# Patient Record
Sex: FEMALE | Race: WHITE | NOT HISPANIC OR LATINO | URBAN - METROPOLITAN AREA
[De-identification: names, ages, dates, MRNs, and addresses within clinical notes are randomized per-mention and may not be internally consistent; named-entity substitution may affect disease eponyms.]

---

## 2022-07-10 ENCOUNTER — MEDICAL CORRESPONDENCE (OUTPATIENT)
Dept: HEALTH INFORMATION MANAGEMENT | Facility: CLINIC | Age: 57
End: 2022-07-10

## 2022-07-29 ENCOUNTER — HOSPITAL ENCOUNTER (OUTPATIENT)
Dept: GENERAL RADIOLOGY | Facility: CLINIC | Age: 57
Discharge: HOME | End: 2022-07-29
Attending: ORTHOPAEDIC SURGERY | Admitting: ORTHOPAEDIC SURGERY
Payer: COMMERCIAL

## 2022-07-29 VITALS — DIASTOLIC BLOOD PRESSURE: 71 MMHG | HEART RATE: 57 BPM | OXYGEN SATURATION: 100 % | SYSTOLIC BLOOD PRESSURE: 134 MMHG

## 2022-07-29 DIAGNOSIS — M79.673 FOOT PAIN: ICD-10-CM

## 2022-07-29 PROCEDURE — 77002 NEEDLE LOCALIZATION BY XRAY: CPT

## 2022-07-29 PROCEDURE — 255N000002 HC RX 255 OP 636: Performed by: ORTHOPAEDIC SURGERY

## 2022-07-29 PROCEDURE — 250N000011 HC RX IP 250 OP 636: Performed by: ORTHOPAEDIC SURGERY

## 2022-07-29 PROCEDURE — 250N000009 HC RX 250: Performed by: ORTHOPAEDIC SURGERY

## 2022-07-29 RX ORDER — IOPAMIDOL 408 MG/ML
10 INJECTION, SOLUTION INTRATHECAL ONCE
Status: COMPLETED | OUTPATIENT
Start: 2022-07-29 | End: 2022-07-29

## 2022-07-29 RX ORDER — TRIAMCINOLONE ACETONIDE 40 MG/ML
40 INJECTION, SUSPENSION INTRA-ARTICULAR; INTRAMUSCULAR ONCE
Status: COMPLETED | OUTPATIENT
Start: 2022-07-29 | End: 2022-07-29

## 2022-07-29 RX ADMIN — LIDOCAINE HYDROCHLORIDE 2.5 ML: 10 INJECTION, SOLUTION EPIDURAL; INFILTRATION; INTRACAUDAL; PERINEURAL at 10:27

## 2022-07-29 RX ADMIN — TRIAMCINOLONE ACETONIDE 40 MG: 40 INJECTION, SUSPENSION INTRA-ARTICULAR; INTRAMUSCULAR at 10:43

## 2022-07-29 RX ADMIN — IOPAMIDOL 1 ML: 408 INJECTION, SOLUTION INTRATHECAL at 10:28

## 2022-07-29 NOTE — DISCHARGE INSTRUCTIONS
JOINT STEROID INJECTION DISCHARGE INSTRUCTIONS    What to expect  After the procedure, you may feel some temporary relief from the local anesthetic, but that will likely wear off within a few hours. Your symptoms may then return to pre-procedure level, or even be temporarily worse for a day or two.  For many people, the steroid begins to provide some relief within 2-3 days, but it can take up to 2 weeks. If you have no improvement in your symptoms after two weeks, please contact your referring provider to discuss next steps.  What to do  Minimize your activity today. You may resume your normal activity tomorrow as tolerated. Avoid vigorous or strenuous activity until your symptoms improve, or as directed by your referring provider.   You may shower tomorrow, but do not submerge in bathtub, hot tub or pool for 48 hours.    Use ice packs as needed for discomfort.  You may resume all medications, including blood thinners.  You may take over the counter acetaminophen (Tylenol) or ibuprofen (Motrin, Advil) for mild discomfort after the procedure.    What to watch for  Bruising or slight swelling at the puncture site can be normal. If you begin to develop redness or excessive swelling at the site, fever over 1010F, notable increase in pain, weakness, or numbness, please contact your primary care or referring provider.  Side effects from the steroid are often mild and go away in a few days. Common side effects may include facial flushing, restlessness, irritability, difficulty sleeping, elevated blood pressure, and increased blood sugar.   If you have diabetes, monitor your blood sugar closely. Contact the provider who manages your diabetes to help you control your blood sugar if needed.  If you have questions or concerns  You may contact the [facility]iology Department at [phone] between 8am-4:30pm Monday through Friday.  If you have urgent questions outside of these normal business hours, please contact  the [facility] on-call physician at [phone].    The provider who performed your procedure was Dr. Arriaga.

## 2022-08-17 ENCOUNTER — LAB (OUTPATIENT)
Dept: LAB | Facility: CLINIC | Age: 57
End: 2022-08-17
Payer: COMMERCIAL

## 2022-08-17 DIAGNOSIS — E03.9 HYPOTHYROIDISM, UNSPECIFIED: Primary | ICD-10-CM

## 2022-08-17 DIAGNOSIS — E03.9 HYPOTHYROIDISM, UNSPECIFIED: ICD-10-CM

## 2022-08-17 LAB
T3FREE SERPL-MCNC: 2.7 PG/ML (ref 2–4.4)
T4 FREE SERPL-MCNC: 0.98 NG/DL (ref 0.76–1.46)
TSH SERPL DL<=0.005 MIU/L-ACNC: 0.13 MU/L (ref 0.4–4)

## 2022-08-17 PROCEDURE — 84439 ASSAY OF FREE THYROXINE: CPT

## 2022-08-17 PROCEDURE — 36415 COLL VENOUS BLD VENIPUNCTURE: CPT

## 2022-08-17 PROCEDURE — 84481 FREE ASSAY (FT-3): CPT

## 2022-08-17 PROCEDURE — 84443 ASSAY THYROID STIM HORMONE: CPT

## 2022-10-28 ENCOUNTER — OFFICE VISIT (OUTPATIENT)
Dept: FAMILY MEDICINE | Facility: CLINIC | Age: 57
End: 2022-10-28
Payer: COMMERCIAL

## 2022-10-28 VITALS
HEIGHT: 64 IN | SYSTOLIC BLOOD PRESSURE: 126 MMHG | DIASTOLIC BLOOD PRESSURE: 84 MMHG | OXYGEN SATURATION: 100 % | TEMPERATURE: 99.1 F | BODY MASS INDEX: 22.53 KG/M2 | HEART RATE: 60 BPM | WEIGHT: 132 LBS

## 2022-10-28 DIAGNOSIS — Z01.818 PREOP GENERAL PHYSICAL EXAM: Primary | ICD-10-CM

## 2022-10-28 DIAGNOSIS — Z12.31 ENCOUNTER FOR SCREENING MAMMOGRAM FOR BREAST CANCER: ICD-10-CM

## 2022-10-28 DIAGNOSIS — F41.9 ANXIETY: ICD-10-CM

## 2022-10-28 DIAGNOSIS — M79.671 RIGHT FOOT PAIN: ICD-10-CM

## 2022-10-28 PROBLEM — M79.7 FIBROMYALGIA: Status: ACTIVE | Noted: 2021-01-07

## 2022-10-28 PROBLEM — F10.20 ALCOHOL DEPENDENCE (H): Status: ACTIVE | Noted: 2017-01-22

## 2022-10-28 PROBLEM — M25.569 KNEE PAIN: Status: ACTIVE | Noted: 2022-04-12

## 2022-10-28 PROBLEM — E78.5 HYPERLIPIDEMIA: Status: ACTIVE | Noted: 2022-10-28

## 2022-10-28 PROBLEM — M25.559 HIP PAIN: Status: ACTIVE | Noted: 2022-04-12

## 2022-10-28 LAB
BASOPHILS # BLD AUTO: 0 10E3/UL (ref 0–0.2)
BASOPHILS NFR BLD AUTO: 1 %
EOSINOPHIL # BLD AUTO: 0.2 10E3/UL (ref 0–0.7)
EOSINOPHIL NFR BLD AUTO: 3 %
ERYTHROCYTE [DISTWIDTH] IN BLOOD BY AUTOMATED COUNT: 12.4 % (ref 10–15)
HCT VFR BLD AUTO: 40.4 % (ref 35–47)
HGB BLD-MCNC: 13.4 G/DL (ref 11.7–15.7)
LYMPHOCYTES # BLD AUTO: 2.1 10E3/UL (ref 0.8–5.3)
LYMPHOCYTES NFR BLD AUTO: 38 %
MCH RBC QN AUTO: 33.1 PG (ref 26.5–33)
MCHC RBC AUTO-ENTMCNC: 33.2 G/DL (ref 31.5–36.5)
MCV RBC AUTO: 100 FL (ref 78–100)
MONOCYTES # BLD AUTO: 0.5 10E3/UL (ref 0–1.3)
MONOCYTES NFR BLD AUTO: 9 %
NEUTROPHILS # BLD AUTO: 2.8 10E3/UL (ref 1.6–8.3)
NEUTROPHILS NFR BLD AUTO: 50 %
PLATELET # BLD AUTO: 318 10E3/UL (ref 150–450)
RBC # BLD AUTO: 4.05 10E6/UL (ref 3.8–5.2)
WBC # BLD AUTO: 5.7 10E3/UL (ref 4–11)

## 2022-10-28 PROCEDURE — 80053 COMPREHEN METABOLIC PANEL: CPT | Performed by: PHYSICIAN ASSISTANT

## 2022-10-28 PROCEDURE — 99204 OFFICE O/P NEW MOD 45 MIN: CPT | Performed by: PHYSICIAN ASSISTANT

## 2022-10-28 PROCEDURE — 36415 COLL VENOUS BLD VENIPUNCTURE: CPT | Performed by: PHYSICIAN ASSISTANT

## 2022-10-28 PROCEDURE — 85025 COMPLETE CBC W/AUTO DIFF WBC: CPT | Performed by: PHYSICIAN ASSISTANT

## 2022-10-28 RX ORDER — ALPRAZOLAM 0.25 MG
TABLET ORAL
COMMUNITY
Start: 2022-09-16 | End: 2022-10-28

## 2022-10-28 RX ORDER — ZALEPLON 5 MG/1
CAPSULE ORAL
COMMUNITY
Start: 2022-09-22 | End: 2022-10-28

## 2022-10-28 RX ORDER — DULOXETIN HYDROCHLORIDE 60 MG/1
60 CAPSULE, DELAYED RELEASE ORAL DAILY
Qty: 30 CAPSULE | Refills: 1 | Status: SHIPPED | OUTPATIENT
Start: 2022-10-28 | End: 2022-11-28

## 2022-10-28 RX ORDER — LIOTHYRONINE SODIUM 5 UG/1
TABLET ORAL
COMMUNITY
Start: 2022-07-30 | End: 2022-10-28

## 2022-10-28 RX ORDER — DULOXETIN HYDROCHLORIDE 60 MG/1
CAPSULE, DELAYED RELEASE ORAL
COMMUNITY
Start: 2022-07-13 | End: 2022-10-28

## 2022-10-28 RX ORDER — DULOXETIN HYDROCHLORIDE 30 MG/1
CAPSULE, DELAYED RELEASE ORAL
COMMUNITY
Start: 2022-07-21 | End: 2022-10-28 | Stop reason: DRUGHIGH

## 2022-10-28 RX ORDER — ALPRAZOLAM 0.25 MG
0.25 TABLET ORAL
Qty: 30 TABLET | Refills: 1 | Status: SHIPPED | OUTPATIENT
Start: 2022-10-28

## 2022-10-28 RX ORDER — LEVOTHYROXINE SODIUM 125 UG/1
TABLET ORAL
COMMUNITY
Start: 2022-10-12

## 2022-10-28 RX ORDER — PROPRANOLOL HYDROCHLORIDE 10 MG/1
TABLET ORAL
COMMUNITY
Start: 2022-07-21 | End: 2022-10-28

## 2022-10-28 RX ORDER — NALTREXONE HYDROCHLORIDE 50 MG/1
TABLET, FILM COATED ORAL
COMMUNITY
Start: 2022-10-12

## 2022-10-28 RX ORDER — ZALEPLON 5 MG/1
10 CAPSULE ORAL AT BEDTIME
Qty: 60 CAPSULE | Refills: 1 | Status: SHIPPED | OUTPATIENT
Start: 2022-10-28 | End: 2022-11-28

## 2022-10-28 ASSESSMENT — ENCOUNTER SYMPTOMS
SHORTNESS OF BREATH: 0
MYALGIAS: 0
JOINT SWELLING: 1
DIZZINESS: 0
HEMATURIA: 0
DYSURIA: 0
FEVER: 0
BREAST MASS: 0
PALPITATIONS: 0
ABDOMINAL PAIN: 0
ARTHRALGIAS: 1
COUGH: 0
HEADACHES: 0
NERVOUS/ANXIOUS: 0
DIARRHEA: 0
WEAKNESS: 0
EYE PAIN: 0
FREQUENCY: 0
SORE THROAT: 0
HEARTBURN: 0
PARESTHESIAS: 0
CHILLS: 0
HEMATOCHEZIA: 0
NAUSEA: 0
CONSTIPATION: 0

## 2022-10-28 ASSESSMENT — PATIENT HEALTH QUESTIONNAIRE - PHQ9
SUM OF ALL RESPONSES TO PHQ QUESTIONS 1-9: 7
SUM OF ALL RESPONSES TO PHQ QUESTIONS 1-9: 7
10. IF YOU CHECKED OFF ANY PROBLEMS, HOW DIFFICULT HAVE THESE PROBLEMS MADE IT FOR YOU TO DO YOUR WORK, TAKE CARE OF THINGS AT HOME, OR GET ALONG WITH OTHER PEOPLE: NOT DIFFICULT AT ALL

## 2022-10-28 NOTE — PATIENT INSTRUCTIONS
PLEASE CALL TO SCHEDULE YOUR MAMMOGRAM  [facility] [phone]  [facility] [phone]  [facility]   [phone]  Central Scheduling (all locations, diagnostic also) [phone]    If you are under/uninsured, we recommend you contact the [facility]. They offer mammograms on a sliding fee charge. You can schedule with them at [phone].   Preparing for Your Surgery  Getting started  A nurse will call you to review your health history and instructions. They will give you an arrival time based on your scheduled surgery time. Please be ready to share:  Your doctor's clinic name and phone number  Your medical, surgical and anesthesia history  A list of allergies and sensitivities  A list of medicines, including herbal treatments and over-the-counter drugs  Whether the patient has a legal guardian (ask how to send us the papers in advance)  Please tell us if you're pregnant--or if there's any chance you might be pregnant. Some surgeries may injure a fetus (unborn baby), so they require a pregnancy test. Surgeries that are safe for a fetus don't always need a test, and you can choose whether to have one.   If you have a child who's having surgery, please ask for a copy of Preparing for Your Child's Surgery.    Preparing for surgery  Within 10 to 30 days of surgery: Have a pre-op exam (sometimes called an H&P, or History and Physical). This can be done at a clinic or pre-operative center.  If you're having a , you may not need this exam. Talk to your care team.  At your pre-op exam, talk to your care team about all medicines you take. If you need to stop any medicines before surgery, ask when to start taking them again.  We do this for your safety. Many medicines can make you bleed too much during surgery. Some change how well surgery (anesthesia) drugs work.  Call your insurance company to let them know you're having surgery. (If you don't have insurance,  call [phone].)  Call your clinic if there's any change in your health. This includes signs of a cold or flu (sore throat, runny nose, cough, rash, fever). It also includes a scrape or scratch near the surgery site.  If you have questions on the day of surgery, call your hospital or surgery center.  COVID testing  You may need to be tested for COVID-19 before having surgery. If so, we will give you instructions (or click here).  Eating and drinking guidelines  For your safety: Unless your surgeon tells you otherwise, follow the guidelines below.  Eat and drink as usual until 8 hours before surgery. After that, no food or milk.  Drink clear liquids until 2 hours before surgery. These are liquids you can see through, like water, Gatorade and Propel Water. You may also have black coffee and tea (no cream or milk).  Nothing by mouth within 2 hours of surgery. This includes gum, candy and breath mints.  If you drink alcohol: Stop drinking it the night before surgery.  If your care team tells you to take medicine on the morning of surgery, it's okay to take it with a sip of water.  Preventing infection  Shower or bathe the night before and morning of your surgery. Follow the instructions your clinic gave you. (If no instructions, use regular soap.)  Don't shave or clip hair near your surgery site. We'll remove the hair if needed.  Don't smoke or vape the morning of surgery. You may chew nicotine gum up to 2 hours before surgery. A nicotine patch is okay.  Note: Some surgeries require you to completely quit smoking and nicotine. Check with your surgeon.  Your care team will make every effort to keep you safe from infection. We will:  Clean our hands often with soap and water (or an alcohol-based hand rub).  Clean the skin at your surgery site with a special soap that kills germs.  Give you a special gown to keep you warm. (Cold raises the risk of infection.)  Wear special hair covers, masks, gowns and gloves during  surgery.  Give antibiotic medicine, if prescribed. Not all surgeries need antibiotics.  What to bring on the day of surgery  Photo ID and insurance card  Copy of your health care directive, if you have one  Glasses and hearing aides (bring cases)  You can't wear contacts during surgery  Inhaler and eye drops, if you use them (tell us about these when you arrive)  CPAP machine or breathing device, if you use them  A few personal items, if spending the night  If you have . . .  A pacemaker, ICD (cardiac defibrillator) or other implant: Bring the ID card.  An implanted stimulator: Bring the remote control.  A legal guardian: Bring a copy of the certified (court-stamped) guardianship papers.  Please remove any jewelry, including body piercings. Leave jewelry and other valuables at home.  If you're going home the day of surgery  You must have a responsible adult drive you home. They should stay with you overnight as well.  If you don't have someone to stay with you, and you aren't safe to go home alone, we may keep you overnight. Insurance often won't pay for this.  After surgery  If it's hard to control your pain or you need more pain medicine, please call your surgeon's office.  Questions?   If you have any questions for your care team, list them here: _________________________________________________________________________________________________________________________________________________________________________ ____________________________________ ____________________________________ ____________________________________  For informational purposes only. Not to replace the advice of your health care provider. Copyright   2003, 2019 [facility]. All rights reserved. Clinically reviewed by Sabi Childs MD. [employer] [id number] - REV 07/22.

## 2022-10-28 NOTE — PROGRESS NOTES
[facility]  [address], [address]  [facility] [address]  Phone: [phone]  Primary Provider: No Ref-Primary, Physician  Pre-op Performing Provider: JACKSON SAGASTUME      PREOPERATIVE EVALUATION:  Today's date: 10/28/2022    Inna Phillip is a 56 year old female who presents for a preoperative evaluation.    Surgical Information:  Surgery/Procedure: Fusion Metatarsol and Shave Biospy in right foot   Surgery Location: [facility]   Surgeon: tbd  Surgery Date: 11/21/22  Time of Surgery: TBD  Where patient plans to recover: At home with family  Fax number for surgical facility: tbd    Type of Anesthesia Anticipated: to be determined    Assessment & Plan     The proposed surgical procedure is considered INTERMEDIATE risk.    Preop general physical exam  Right foot pain  - Comprehensive metabolic panel; Future  - CBC with Platelets & Differential; Future    Anxiety  Long standing, chronic, controlled- patient establishing care with new PCP next month but needs refills on regular/daily medicines until then; previous psychiatrist has retired and she would prefer not to have to go back to psychiatry due to expense. Refills sent. Proper use and risk for abuse discussed with patient at length. Patient to return to clinic when refills needed.   - DULoxetine (CYMBALTA) 60 MG capsule; Take 1 capsule (60 mg) by mouth daily  - zaleplon (SONATA) 5 MG capsule; Take 2 capsules (10 mg) by mouth At Bedtime  - ALPRAZolam (XANAX) 0.25 MG tablet; Take 1 tablet (0.25 mg) by mouth nightly as needed for anxiety    Encounter for screening mammogram for breast cancer  - *MA Screening Digital Bilateral; Future    Risks and Recommendations:  The patient has the following additional risks and recommendations for perioperative complications:   - No identified additional risk factors other than previously addressed    Medication Instructions:   - naltrexone PO: HOLD for 3  days (72 hours) prior to surgery.   - Benzodiazepines: Continue without modification.   - SSRIs, SNRIs, TCAs, Antipsychotics: Continue without modification.     RECOMMENDATION:  APPROVAL GIVEN to proceed with proposed procedure, without further diagnostic evaluation.    Review of prior external note(s) from - previous routine PCP and speciality notes      40 minutes spent on the date of the encounter doing chart review, history and exam, documentation and further activities per the note        Subjective     HPI related to upcoming procedure: history of right foot pain/issue with plans for surgical repair      Preop Questions 10/28/2022   1. Have you ever had a heart attack or stroke? No   2. Have you ever had surgery on your heart or blood vessels, such as a stent placement, a coronary artery bypass, or surgery on an artery in your head, neck, heart, or legs? No   3. Do you have chest pain with activity? No   4. Do you have a history of  heart failure? No   5. Do you currently have a cold, bronchitis or symptoms of other infection? No   6. Do you have a cough, shortness of breath, or wheezing? No   7. Do you or anyone in your family have previous history of blood clots? No   8. Do you or does anyone in your family have a serious bleeding problem such as prolonged bleeding following surgeries or cuts? No   9. Have you ever had problems with anemia or been told to take iron pills? No   10. Have you had any abnormal blood loss such as black, tarry or bloody stools, or abnormal vaginal bleeding? No   11. Have you ever had a blood transfusion? No   12. Are you willing to have a blood transfusion if it is medically needed before, during, or after your surgery? Yes   13. Have you or any of your relatives ever had problems with anesthesia? YES   14. Do you have sleep apnea, excessive snoring or daytime drowsiness? No   15. Do you have any artifical heart valves or other implanted medical devices like a pacemaker,  defibrillator, or continuous glucose monitor? No   16. Do you have artificial joints? No   17. Are you allergic to latex? No   18. Is there any chance that you may be pregnant? No     Health Care Directive:  Patient does not have a Health Care Directive or Living Will: Discussed advance care planning with patient; however, patient declined at this time.    Preoperative Review of :   reviewed - controlled substances reflected in medication list.      Status of Chronic Conditions:  See problem list for active medical problems.  Problems all longstanding and stable, except as noted/documented.  See ROS for pertinent symptoms related to these conditions.    HYPOTHYROIDISM - Patient has a longstanding history of chronic Hypothyroidism. Patient has been doing well, noting no tremor, insomnia, hair loss or changes in skin texture. Continues to take medications as directed, without adverse reactions or side effects. Last TSH   Lab Results   Component Value Date    TSH 0.13 (L) 08/17/2022   .        Patient seeing endocrinology prior to surgery as well - will update TSH levels at that time.    Review of Systems   Constitutional: Negative for chills and fever.   HENT: Positive for hearing loss. Negative for congestion, ear pain and sore throat.    Eyes: Negative for pain and visual disturbance.   Respiratory: Negative for cough and shortness of breath.    Cardiovascular: Negative for chest pain and palpitations.   Gastrointestinal: Negative for abdominal pain, constipation, diarrhea and nausea.   Genitourinary: Negative for dysuria, frequency, genital sores, hematuria, pelvic pain, urgency, vaginal bleeding and vaginal discharge.   Musculoskeletal: Positive for arthralgias and joint swelling. Negative for myalgias.   Skin: Negative for rash.   Neurological: Negative for dizziness, weakness and headaches.   Psychiatric/Behavioral: The patient is not nervous/anxious.      Constitutional, neuro, ENT, endocrine, pulmonary,  cardiac, gastrointestinal, genitourinary, musculoskeletal, integument and psychiatric systems are negative, except as otherwise noted.    Patient Active Problem List    Diagnosis Date Noted     Hyperlipidemia 10/28/2022     Priority: Medium     Hip pain 04/12/2022     Priority: Medium     Knee pain 04/12/2022     Priority: Medium     Fibromyalgia 01/07/2021     Priority: Medium     Cervical high risk HPV (human papillomavirus) test positive 10/28/2019     Priority: Medium     Formatting of this note might be different from the original.  [facility] Review:    History:    10/2019: [facility]-H, HPV+ (non 16/18)  10/2019: [place] neg  06/2021: NILM HPV+ (non 16/18)    Plan, per ASCCP guidelines: Colposcopy       Alcohol dependence (H) 01/22/2017     Priority: Medium     Chronic insomnia 07/30/2014     Priority: Medium     Allergic rhinitis 01/22/2014     Priority: Medium     Anxiety 04/28/2012     Priority: Medium     Bipolar 2 disorder (H) 01/02/2008     Priority: Medium     Formatting of this note might be different from the original.  She has tried several med, cymbalta seems to work the best for her. She asked for a re-start of this med 12/3/16, risks/benefits and dosing reviewed. She has a psychiatrist follow up appt in approx a month.       Hypothyroidism 01/02/2008     Priority: Medium      History reviewed. No pertinent past medical history.  History reviewed. No pertinent surgical history.  Current Outpatient Medications   Medication Sig Dispense Refill     ALPRAZolam (XANAX) 0.25 MG tablet Take 1 tablet (0.25 mg) by mouth nightly as needed for anxiety 30 tablet 1     DULoxetine (CYMBALTA) 60 MG capsule Take 1 capsule (60 mg) by mouth daily 30 capsule 1     levothyroxine (SYNTHROID/LEVOTHROID) 125 MCG tablet TAKE 1/2 TABLET BY MOUTH EVERY MORNING BEFORE A MEAL       naltrexone (DEPADE/REVIA) 50 MG tablet PATIENT TO MAKE UP A NALTREXONE SOLUTION PER PROVIDER DIRECTIONS. MAX DOSE OF 4.5 MG PER DAY       zaleplon (SONATA) 5 MG  "capsule Take 2 capsules (10 mg) by mouth At Bedtime 60 capsule 1       Allergies   Allergen Reactions     Dust Mites      Molds & Smuts      Pollen Extract         Social History     Tobacco Use     Smoking status: Not on file     Smokeless tobacco: Not on file   Substance Use Topics     Alcohol use: Not on file       History   Drug Use Not on file         Objective     /84   Pulse 60   Temp 99.1  F (37.3  C) (Temporal)   Ht 1.626 m (5' 4\")   Wt 59.9 kg (132 lb)   SpO2 100%   BMI 22.66 kg/m      Physical Exam    GENERAL APPEARANCE: healthy, alert and no distress     EYES: EOMI, PERRL     HENT: ear canals and TM's normal and nose and mouth without ulcers or lesions     NECK: no adenopathy, no asymmetry, masses, or scars and thyroid normal to palpation     RESP: lungs clear to auscultation - no rales, rhonchi or wheezes     CV: regular rates and rhythm, normal S1 S2, no S3 or S4 and no murmur, click or rub     ABDOMEN:  soft, nontender, no HSM or masses and bowel sounds normal     MS: extremities normal- no gross deformities noted, no evidence of inflammation in joints, FROM in all extremities.     SKIN: no suspicious lesions or rashes     NEURO: Normal strength and tone, sensory exam grossly normal, mentation intact and speech normal     PSYCH: mentation appears normal. and affect normal/bright     LYMPHATICS: No cervical adenopathy    No results for input(s): HGB, PLT, INR, NA, POTASSIUM, CR, A1C in the last [id number] hours.     Diagnostics:  Labs pending at this time.  Results will be reviewed when available.   No EKG required, no history of coronary heart disease, significant arrhythmia, peripheral arterial disease or other structural heart disease.    Revised Cardiac Risk Index (RCRI):  The patient has the following serious cardiovascular risks for perioperative complications:   - No serious cardiac risks = 0 points     RCRI Interpretation: 0 points: Class I (very low risk - 0.4% complication rate)       "     Signed Electronically by: Deepa Downs PA-C  Copy of this evaluation report is provided to requesting physician.

## 2022-10-29 LAB
ALBUMIN SERPL-MCNC: 4.2 G/DL (ref 3.4–5)
ALP SERPL-CCNC: 62 U/L (ref 40–150)
ALT SERPL W P-5'-P-CCNC: 19 U/L (ref 0–50)
ANION GAP SERPL CALCULATED.3IONS-SCNC: 6 MMOL/L (ref 3–14)
AST SERPL W P-5'-P-CCNC: 14 U/L (ref 0–45)
BILIRUB SERPL-MCNC: 0.6 MG/DL (ref 0.2–1.3)
BUN SERPL-MCNC: 16 MG/DL (ref 7–30)
CALCIUM SERPL-MCNC: 9.5 MG/DL (ref 8.5–10.1)
CHLORIDE BLD-SCNC: 106 MMOL/L (ref 94–109)
CO2 SERPL-SCNC: 27 MMOL/L (ref 20–32)
CREAT SERPL-MCNC: 0.57 MG/DL (ref 0.52–1.04)
GFR SERPL CREATININE-BSD FRML MDRD: >90 ML/MIN/1.73M2
GLUCOSE BLD-MCNC: 105 MG/DL (ref 70–99)
POTASSIUM BLD-SCNC: 4.2 MMOL/L (ref 3.4–5.3)
PROT SERPL-MCNC: 7.3 G/DL (ref 6.8–8.8)
SODIUM SERPL-SCNC: 139 MMOL/L (ref 133–144)

## 2022-10-31 NOTE — RESULT ENCOUNTER NOTE
"Car Keith  Your attached labs are within normal limits. Best of luck with your upcoming surgery.  Please contact the office with any questions or concerns.    Deepa Riley \"José Miguel\" AURORA Downs    "

## 2022-11-19 ENCOUNTER — HEALTH MAINTENANCE LETTER (OUTPATIENT)
Age: 57
End: 2022-11-19

## 2022-11-28 ENCOUNTER — OFFICE VISIT (OUTPATIENT)
Dept: FAMILY MEDICINE | Facility: CLINIC | Age: 57
End: 2022-11-28
Payer: COMMERCIAL

## 2022-11-28 VITALS
RESPIRATION RATE: 16 BRPM | DIASTOLIC BLOOD PRESSURE: 89 MMHG | BODY MASS INDEX: 22.2 KG/M2 | WEIGHT: 130 LBS | HEIGHT: 64 IN | HEART RATE: 65 BPM | OXYGEN SATURATION: 100 % | SYSTOLIC BLOOD PRESSURE: 129 MMHG | TEMPERATURE: 99.1 F

## 2022-11-28 DIAGNOSIS — R87.810 CERVICAL HIGH RISK HPV (HUMAN PAPILLOMAVIRUS) TEST POSITIVE: ICD-10-CM

## 2022-11-28 DIAGNOSIS — Z12.11 SCREEN FOR COLON CANCER: ICD-10-CM

## 2022-11-28 DIAGNOSIS — F31.9 BIPOLAR DEPRESSION (H): ICD-10-CM

## 2022-11-28 DIAGNOSIS — Z00.00 ROUTINE GENERAL MEDICAL EXAMINATION AT A HEALTH CARE FACILITY: Primary | ICD-10-CM

## 2022-11-28 DIAGNOSIS — Z11.59 NEED FOR HEPATITIS C SCREENING TEST: ICD-10-CM

## 2022-11-28 DIAGNOSIS — Z11.4 SCREENING FOR HIV (HUMAN IMMUNODEFICIENCY VIRUS): ICD-10-CM

## 2022-11-28 DIAGNOSIS — E78.5 HYPERLIPIDEMIA LDL GOAL <130: ICD-10-CM

## 2022-11-28 DIAGNOSIS — R03.0 ELEVATED BP WITHOUT DIAGNOSIS OF HYPERTENSION: ICD-10-CM

## 2022-11-28 DIAGNOSIS — F41.9 ANXIETY: ICD-10-CM

## 2022-11-28 LAB
CHOLEST SERPL-MCNC: 255 MG/DL
HCV AB SERPL QL IA: NONREACTIVE
HDLC SERPL-MCNC: 77 MG/DL
HIV 1+2 AB+HIV1 P24 AG SERPL QL IA: NONREACTIVE
LDLC SERPL CALC-MCNC: 154 MG/DL
NONHDLC SERPL-MCNC: 178 MG/DL
TRIGL SERPL-MCNC: 119 MG/DL

## 2022-11-28 PROCEDURE — 90471 IMMUNIZATION ADMIN: CPT | Performed by: FAMILY MEDICINE

## 2022-11-28 PROCEDURE — 99213 OFFICE O/P EST LOW 20 MIN: CPT | Mod: 25 | Performed by: FAMILY MEDICINE

## 2022-11-28 PROCEDURE — 90715 TDAP VACCINE 7 YRS/> IM: CPT | Performed by: FAMILY MEDICINE

## 2022-11-28 PROCEDURE — 86803 HEPATITIS C AB TEST: CPT | Performed by: FAMILY MEDICINE

## 2022-11-28 PROCEDURE — 87624 HPV HI-RISK TYP POOLED RSLT: CPT | Performed by: FAMILY MEDICINE

## 2022-11-28 PROCEDURE — 87389 HIV-1 AG W/HIV-1&-2 AB AG IA: CPT | Performed by: FAMILY MEDICINE

## 2022-11-28 PROCEDURE — 80061 LIPID PANEL: CPT | Performed by: FAMILY MEDICINE

## 2022-11-28 PROCEDURE — 99396 PREV VISIT EST AGE 40-64: CPT | Mod: 25 | Performed by: FAMILY MEDICINE

## 2022-11-28 PROCEDURE — 36415 COLL VENOUS BLD VENIPUNCTURE: CPT | Performed by: FAMILY MEDICINE

## 2022-11-28 PROCEDURE — 90472 IMMUNIZATION ADMIN EACH ADD: CPT | Performed by: FAMILY MEDICINE

## 2022-11-28 PROCEDURE — 90677 PCV20 VACCINE IM: CPT | Performed by: FAMILY MEDICINE

## 2022-11-28 PROCEDURE — G0123 SCREEN CERV/VAG THIN LAYER: HCPCS | Performed by: FAMILY MEDICINE

## 2022-11-28 RX ORDER — ALPRAZOLAM 0.25 MG
0.25 TABLET ORAL DAILY PRN
Qty: 30 TABLET | Refills: 0 | Status: SHIPPED | OUTPATIENT
Start: 2022-11-28

## 2022-11-28 RX ORDER — ZALEPLON 5 MG/1
5-10 CAPSULE ORAL AT BEDTIME
Qty: 60 CAPSULE | Refills: 1 | Status: SHIPPED | OUTPATIENT
Start: 2022-11-28

## 2022-11-28 RX ORDER — DULOXETIN HYDROCHLORIDE 60 MG/1
60 CAPSULE, DELAYED RELEASE ORAL DAILY
Qty: 30 CAPSULE | Refills: 11 | Status: SHIPPED | OUTPATIENT
Start: 2022-11-28 | End: 2023-12-29

## 2022-11-28 ASSESSMENT — ANXIETY QUESTIONNAIRES
5. BEING SO RESTLESS THAT IT IS HARD TO SIT STILL: NOT AT ALL
1. FEELING NERVOUS, ANXIOUS, OR ON EDGE: NOT AT ALL
7. FEELING AFRAID AS IF SOMETHING AWFUL MIGHT HAPPEN: NOT AT ALL
3. WORRYING TOO MUCH ABOUT DIFFERENT THINGS: NOT AT ALL
6. BECOMING EASILY ANNOYED OR IRRITABLE: NOT AT ALL
GAD7 TOTAL SCORE: 0
4. TROUBLE RELAXING: NOT AT ALL
GAD7 TOTAL SCORE: 0
2. NOT BEING ABLE TO STOP OR CONTROL WORRYING: NOT AT ALL
GAD7 TOTAL SCORE: 0
7. FEELING AFRAID AS IF SOMETHING AWFUL MIGHT HAPPEN: NOT AT ALL

## 2022-11-28 ASSESSMENT — ENCOUNTER SYMPTOMS
SHORTNESS OF BREATH: 0
SORE THROAT: 0
FREQUENCY: 0
COUGH: 0
MYALGIAS: 0
NAUSEA: 0
DIARRHEA: 0
HEMATURIA: 0
FEVER: 0
HEADACHES: 0
EYE PAIN: 0
PALPITATIONS: 1
JOINT SWELLING: 1
NERVOUS/ANXIOUS: 0
CONSTIPATION: 0
HEARTBURN: 0
DYSURIA: 0
HEMATOCHEZIA: 0
PARESTHESIAS: 0
ABDOMINAL PAIN: 0
WEAKNESS: 0
CHILLS: 0
ARTHRALGIAS: 0
DIZZINESS: 0

## 2022-11-28 ASSESSMENT — PATIENT HEALTH QUESTIONNAIRE - PHQ9
10. IF YOU CHECKED OFF ANY PROBLEMS, HOW DIFFICULT HAVE THESE PROBLEMS MADE IT FOR YOU TO DO YOUR WORK, TAKE CARE OF THINGS AT HOME, OR GET ALONG WITH OTHER PEOPLE: NOT DIFFICULT AT ALL
SUM OF ALL RESPONSES TO PHQ QUESTIONS 1-9: 3
SUM OF ALL RESPONSES TO PHQ QUESTIONS 1-9: 3

## 2022-11-28 ASSESSMENT — PAIN SCALES - GENERAL: PAINLEVEL: NO PAIN (0)

## 2022-11-28 NOTE — PROGRESS NOTES
SUBJECTIVE:   CC: Inna is an 56 year old who presents for preventive health visit.   Patient has been advised of split billing requirements and indicates understanding: Yes  Healthy Habits:     Getting at least 3 servings of Calcium per day:  Yes    Bi-annual eye exam:  NO    Dental care twice a year:  Yes    Sleep apnea or symptoms of sleep apnea:  None    Diet:  Gluten-free/reduced    Frequency of exercise:  1 day/week    Duration of exercise:  N/A    Taking medications regularly:  No    Barriers to taking medications:  None    Medication side effects:  None    PHQ-2 Total Score: 0    Additional concerns today:  No  multiple refill needs  S/P metatarsal fusion foot surgery in NOV and recovering at home.  She is a - enjoys her work and sad when work not avaliable - variable income and financial stress.    ? Mood disorder/insomnia/anxiety  Was under care of psychiatrist, they retired and she still getting refills from the office from other provider and due to cost-. Expecting refills on alprazolam and sonata today as establishing care..  Has been on sonata for sleep for a long time.refusing to consider tapering off doses.  Reports most recently difficulty in refills because it should be written 1-2 tabs bedtime .  Unable to sleep without sleep aide for > 25 yrs.  Needs refill on Cymbalta as well. Mood is stable except situational stress from  Orthopedic procedures    Hypertension  / elevated Blood pressure   Blood pressure charting on own for 3 wks.  Ranging from 169/99 on 19th  151/82 11/24th  136/81 this am.  Was told to chart bp three times daily during recent preop.  Mom has hypertension and started on medications just last yr at age 85.      Hypothyroidism-Under care of Dr Sprague at [facility].-most recent visit 10/12/22  tsh was low- declined  Recheck today and will talk with provider about refill. Has been on levothyroxine 125 mcg 1/2 tab once daily .  She was also  recommended to take over the counter ashwagandha 500 mg twice daily for anxiety & stress resilience & add theanine 200 mg twice daily for added benefit.  Also advised to visit //AA'meetings to reduce alcohol use - reach out the sponsor as well. Use of naltrexone was considered for alcohol use and it was deferred.                Today's PHQ-2 Score:   PHQ-2 ( 1999 Pfizer) 11/28/2022   Q1: Little interest or pleasure in doing things 0   Q2: Feeling down, depressed or hopeless 0   PHQ-2 Score 0   Q1: Little interest or pleasure in doing things Not at all   Q2: Feeling down, depressed or hopeless Not at all   PHQ-2 Score 0       Have you ever done Advance Care Planning? (For example, a Health Directive, POLST, or a discussion with a medical provider or your loved ones about your wishes): No, advance care planning information given to patient to review.  Patient declined advance care planning discussion at this time.    Social History     Tobacco Use     Smoking status: Not on file     Smokeless tobacco: Not on file   Substance Use Topics     Alcohol use: Not on file     If you drink alcohol do you typically have >3 drinks per day or >7 drinks per week? No    Alcohol Use 11/28/2022   Prescreen: >3 drinks/day or >7 drinks/week? Yes   Prescreen: >3 drinks/day or >7 drinks/week? -   AUDIT SCORE  7     AUDIT - Alcohol Use Disorders Identification Test - Reproduced from the World Health Organization Audit 2001 (Second Edition) 11/28/2022   1.  How often do you have a drink containing alcohol? 4 or more times a week   2.  How many drinks containing alcohol do you have on a typical day when you are drinking? 3 or 4   3.  How often do you have five or more drinks on one occasion? Never   4.  How often during the last year have you found that you were not able to stop drinking once you had started? Never   5.  How often during the last year have you failed to do what was normally expected of you because of drinking? Never   6.   How often during the last year have you needed a first drink in the morning to get yourself going after a heavy drinking session? Never   7.  How often during the last year have you had a feeling of guilt or remorse after drinking? Less than monthly   8.  How often during the last year have you been unable to remember what happened the night before because of your drinking? Less than monthly   9.  Have you or someone else been injured because of your drinking? No   10. Has a relative, friend, doctor or other health care worker been concerned about your drinking or suggested you cut down? No   TOTAL SCORE 7       Reviewed orders with patient.  Reviewed health maintenance and updated orders accordingly - Yes  Labs reviewed in EPIC    Breast Cancer Screening:    FHS-7:   Breast CA Risk Assessment (FHS-7) 11/28/2022   Did any of your first-degree relatives have breast or ovarian cancer? No   Did any of your relatives have bilateral breast cancer? No         Pertinent mammograms are reviewed under the imaging tab.    History of abnormal Pap smear: YES - updated in Problem List and Health Maintenance accordingly  PAP / HPV Latest Ref Rng & Units 11/28/2022   PAP   Negative for Intraepithelial Lesion or Malignancy (NILM)     Reviewed and updated as needed this visit by clinical staff    Allergies  Meds  Problems  Med Hx  Surg Hx  Fam Hx          Reviewed and updated as needed this visit by Provider    Allergies  Meds  Problems  Med Hx  Surg Hx  Fam Hx             Review of Systems   Constitutional: Negative for chills and fever.   HENT: Negative for congestion, ear pain, hearing loss and sore throat.    Eyes: Negative for pain and visual disturbance.   Respiratory: Negative for cough and shortness of breath.    Cardiovascular: Positive for palpitations. Negative for chest pain and peripheral edema.   Gastrointestinal: Negative for abdominal pain, constipation, diarrhea, heartburn, hematochezia and nausea.  "  Genitourinary: Negative for dysuria, frequency, genital sores, hematuria and urgency.   Musculoskeletal: Positive for joint swelling. Negative for arthralgias and myalgias.   Skin: Negative for rash.   Neurological: Negative for dizziness, weakness, headaches and paresthesias.   Psychiatric/Behavioral: Negative for mood changes. The patient is not nervous/anxious.         OBJECTIVE:   /89   Pulse 65   Temp 99.1  F (37.3  C) (Temporal)   Resp 16   Ht 1.626 m (5' 4\")   Wt 59 kg (130 lb)   SpO2 100%   BMI 22.31 kg/m    Physical Exam  GENERAL: healthy, alert and no distress  EYES: Eyes grossly normal to inspection, PERRL and conjunctivae and sclerae normal  HENT: ear canals and TM's normal, nose and mouth without ulcers or lesions  NECK: no adenopathy, no asymmetry, masses, or scars and thyroid normal to palpation  RESP: lungs clear to auscultation - no rales, rhonchi or wheezes  BREAST: normal without masses, tenderness or nipple discharge and no palpable axillary masses or adenopathy  CV: regular rate and rhythm, normal S1 S2, no S3 or S4, no murmur, click or rub, no peripheral edema and peripheral pulses strong  ABDOMEN: soft, nontender, no hepatosplenomegaly, no masses and bowel sounds normal   (female): normal female external genitalia, normal urethral meatus, vaginal mucosa pink, moist, well rugated, and normal cervix/adnexa/uterus without masses or discharge  MS: no gross musculoskeletal defects noted, no edema  SKIN: no suspicious lesions or rashes  NEURO: Normal strength and tone, mentation intact and speech normal  PSYCH: mentation appears normal, affect normal/bright    Diagnostic Test Results:  Labs reviewed in Epic  No results found for this or any previous visit (from the past 24 hour(s)).    ASSESSMENT/PLAN:   Inna was seen today for physical.    Diagnoses and all orders for this visit:    Routine general medical examination at a health care facility  Comments:  :Colonoscopy is due in " May 2024 (last colonoscopy in May 2014).  mammogram annual.  pap smear sent today.  Orders:  -     REVIEW OF HEALTH MAINTENANCE PROTOCOL ORDERS  -     Pneumococcal 20 Valent Conjugate (Prevnar 20)  -     TDAP VACCINE (Adacel, Boostrix)    Bipolar depression (H)  -     Adult Mental Health  Referral; Future    Anxiety  -     DULoxetine (CYMBALTA) 60 MG capsule; Take 1 capsule (60 mg) by mouth daily  -     Adult Mental Health  Referral; Future  -     ALPRAZolam (XANAX) 0.25 MG tablet; Take 1 tablet (0.25 mg) by mouth daily as needed for anxiety  -     zaleplon (SONATA) 5 MG capsule; Take 1-2 capsules (5-10 mg) by mouth At Bedtime  Long discussion with patient about sleep hygiene , and need to be under care of psychiatrist- as would like to continue with sedatives.  I do not recommend long term use of alprazolam and zaleplon.  Potential medication side effects were discussed with the patient; let me know if any occur.    encouraged psychiatrist consultation and counseling  - reviewed.  Hyperlipidemia LDL goal <130  -     Lipid panel reflex to direct LDL Non-fasting; Future  Recent Labs   Lab Test 22  1137   CHOL 255*   HDL 77   *   TRIG 119     Smoker  No readiness for smoke cessation    Elevated BP without diagnosis of hypertension  Recheck Blood pressure is stable.  Advised low salt diet.  Regular excercise when possible.  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 4 yrs menopause    Need for hepatitis C screening test  -      Hepatitis C Screen Reflex to HCV RNA Quant and Genotype; Future  -     Hepatitis C Screen Reflex to HCV RNA Quant and Genotype    Screening for HIV (human immunodeficiency virus)  -     HIV Antigen Antibody Combo; Future  -     HIV Antigen Antibody Combo    Other orders  -     Cancel: TDAP VACCINE (Adacel, Boostrix)  [[id number]]  -     Lipid panel reflex to direct LDL Non-fasting        Patient has been advised of split billing requirements and indicates understanding: Yes      COUNSELING:  Reviewed preventive health counseling, as reflected in patient instructions       Regular exercise       Healthy diet/nutrition       Vision screening       Hearing screening        She has no history on file for tobacco use.      Maria Fernanda Tilley MD  [facility]

## 2022-11-28 NOTE — PATIENT INSTRUCTIONS
-Normal  BP is  119/79 or lower. Upper number is systolic Blood pressure (SBP). Lower number is diastolic  Blood pressure (DBP)  -Blood pressure between 120-139/80-89 (prehypertensive blood pressure/ class 1 hypertension )   -Hypertensive is  BP of 140/90 or above and needs treatment with medication.  -life style changes that are beneficial to reach goal of normal Blood pressure   1.Weight loss of 1 kg can reduce systolic Blood pressure  (SBP) by 1 mmHg  2.Health healthy diet (eg DASH dietary pattern can reduce SBP by 11 mmHg)  3.Structured excercise program (150 mins aerobic activity/week can reduce SBP by 5 mmHg)  4.Low salt  diet - very important.(eg: cut by 255 or 1000 mg/day to reduce SBP by 5mmHg)  5. Increase 4-5 serving of fresh vegetables & fruits /day- good source of potassium.    Other beneficial tips. Reduction of alcohol     if More than 140/90  after a month of above life style changes  Return visit with MD/provider  for recheck & consideration of medications .

## 2022-11-28 NOTE — NURSING NOTE
Height and weight are per pt. As she is using a leg scooter today and having surgery Monday.  Taco Samayoa ma

## 2022-11-28 NOTE — NURSING NOTE
Prior to immunization administration, verified patients identity using patient s name and date of birth. Please see Immunization Activity for additional information.     Screening Questionnaire for Adult Immunization    Are you sick today?   No   Do you have allergies to medications, food, a vaccine component or latex?   No   Have you ever had a serious reaction after receiving a vaccination?   No   Do you have a long-term health problem with heart, lung, kidney, or metabolic disease (e.g., diabetes), asthma, a blood disorder, no spleen, complement component deficiency, a cochlear implant, or a spinal fluid leak?  Are you on long-term aspirin therapy?   No   Do you have cancer, leukemia, HIV/AIDS, or any other immune system problem?   No   Do you have a parent, brother, or sister with an immune system problem?   No   In the past 3 months, have you taken medications that affect  your immune system, such as prednisone, other steroids, or anticancer drugs; drugs for the treatment of rheumatoid arthritis, Crohn s disease, or psoriasis; or have you had radiation treatments?   No   Have you had a seizure, or a brain or other nervous system problem?   No   During the past year, have you received a transfusion of blood or blood    products, or been given immune (gamma) globulin or antiviral drug?   No   For women: Are you pregnant or is there a chance you could become       pregnant during the next month?   No   Have you received any vaccinations in the past 4 weeks?   No     Immunization questionnaire answers were all negative.        Per orders of Dr. Tilley, injection of Tdap and Pnuemo 20 given by Natalee Kent CMA. Patient instructed to remain in clinic for 15 minutes afterwards, and to report any adverse reaction to me immediately.       Screening performed by Natalee Kent CMA on 11/28/2022 at 11:33 AM.

## 2022-11-29 ENCOUNTER — TELEPHONE (OUTPATIENT)
Dept: FAMILY MEDICINE | Facility: CLINIC | Age: 57
End: 2022-11-29

## 2022-11-29 NOTE — TELEPHONE ENCOUNTER
Prior Authorization Retail Medication Request    Medication/Dose: Zaleplon 5 mg  ICD code (if different than what is on RX):  F41.9  Previously Tried and Failed:    Rationale:      Insurance Name:  [facility]  Insurance ID: [id number]       Pharmacy Information (if different than what is on RX)  Name:  [id number] - [place], [place] - [address] [facility] [address]  Phone:

## 2022-11-29 NOTE — LETTER
Physician Name: Maria Fernanda Tilley  Physician Address:  [facility]  [facility] [address], [address]  [facility] [address]  Phone: [phone]  Fax: [phone]          12/5/2022    Patients Name: Inna Phillip  YOB: 1965  Payor: Payor: [employer] / Plan: [employer] CORE / Product Type: HMO /    ID: [id number]    Name of person submitting request form: Maria Fernanda Tilley MD .    Reason for Request:pt reports she has tried many including trazadone, Ambien, seraquel, and lunesta. And none worked .  she had a preauthorization that was in effect for the last two years through a different doctor but with the same insurance company.       Please authorize zaleplon/sonata Take 1-2 capsules (5-10 mg) by mouth At Bedtime - Oral # 60.    Best Regards,    Maria Fernanda Tilely MD  [facility]  [phone]

## 2022-11-30 NOTE — TELEPHONE ENCOUNTER
A.S.      Please see below medication was denied for patient     If you would like to start appeal please write Letter of Medical Necessity ID number [id number] under the Letter tab and send back to [facility] [place] MED pool.     Thank you,     Brianna Ga RN

## 2022-11-30 NOTE — TELEPHONE ENCOUNTER
Central Prior Authorization Team   Phone: [phone]      PA Initiation    Medication: zaleplon (SONATA) 5 MG capsule--INITIATED  Insurance Company: Ellie ([facility]) - Phone [phone] Fax [phone]  Pharmacy Filling the Rx: [facility] [place] [id number] - [place], [place] - [facility] AT [facility] [address]  Filling Pharmacy Phone: [phone]  Filling Pharmacy Fax:    Start Date: 11/30/2022

## 2022-11-30 NOTE — TELEPHONE ENCOUNTER
Central Prior Authorization Team   Phone: [phone]      PRIOR AUTHORIZATION DENIED    Medication: zaleplon (SONATA) 5 MG capsule--DENIED    Denial Date: 11/30/2022    Denial Rational:        Appeal Information:

## 2022-12-01 ENCOUNTER — MYC MEDICAL ADVICE (OUTPATIENT)
Dept: FAMILY MEDICINE | Facility: CLINIC | Age: 57
End: 2022-12-01

## 2022-12-01 LAB
BKR LAB AP GYN ADEQUACY: NORMAL
BKR LAB AP GYN INTERPRETATION: NORMAL
BKR LAB AP HPV REFLEX: NORMAL
BKR LAB AP LMP: NORMAL
BKR LAB AP PREVIOUS ABNL DX: NORMAL
BKR LAB AP PREVIOUS ABNORMAL: NORMAL
PATH REPORT.COMMENTS IMP SPEC: NORMAL
PATH REPORT.COMMENTS IMP SPEC: NORMAL
PATH REPORT.RELEVANT HX SPEC: NORMAL

## 2022-12-01 NOTE — TELEPHONE ENCOUNTER
Please clarify with patient.  She is a new patient to me.  She is very specific about asking that prescription should be written as 1 to 2 tablets        I am not aware of other sedatives that were prescribed and filled on her.  And that is why I cannot start preauthorization.  Thanks

## 2022-12-02 PROBLEM — F31.9 BIPOLAR DEPRESSION (H): Status: ACTIVE | Noted: 2022-12-02

## 2022-12-05 LAB
HUMAN PAPILLOMA VIRUS 16 DNA: NEGATIVE
HUMAN PAPILLOMA VIRUS 18 DNA: NEGATIVE
HUMAN PAPILLOMA VIRUS FINAL DIAGNOSIS: ABNORMAL
HUMAN PAPILLOMA VIRUS OTHER HR: POSITIVE

## 2022-12-05 NOTE — TELEPHONE ENCOUNTER
A.S.    [employer] message sent by patient:      *-*-*This message has not been handled.*-*-*    I have tried many including trazadone, Ambien, seraquel, and lunesta.   I had a preauthorization that was in effect for the last two years through a different doctor but with the same insurance company.        Brianna Ga RN

## 2022-12-06 ENCOUNTER — TELEPHONE (OUTPATIENT)
Dept: FAMILY MEDICINE | Facility: CLINIC | Age: 57
End: 2022-12-06

## 2022-12-06 NOTE — TELEPHONE ENCOUNTER
Medication Appeal Initiation    We have initiated an appeal for the requested medication:  Medication: zaleplon (SONATA) 5 MG capsule--DENIED  Appeal Start Date:  12/6/2022  Insurance Company: Ellie ([facility]) - Phone [phone] Fax [phone]  Comments:

## 2022-12-06 NOTE — TELEPHONE ENCOUNTER
RN's -  Approval of zaleplon 5MG twice daily - 60 tablets for 30 days.     Leah from [employer] rx calling with approval  - [phone] to call back with questions    Rae Blackwood RN  [facility]

## 2022-12-06 NOTE — TELEPHONE ENCOUNTER
Prior Authorization Approval    Authorization Effective Date: 12/6/2022  Authorization Expiration Date: 12/6/2023  Medication: zaleplon (SONATA) 5 MG capsule- Approved  Approved Dose/Quantity:    Reference #:     Insurance Company: [employer] ([facility]) - Phone [phone] Fax [phone]  Expected CoPay:       CoPay Card Available:      Foundation Assistance Needed:    Which Pharmacy is filling the prescription (Not needed for infusion/clinic administered): [facility] #[id number] - [place], [place] - [address] [place] [address] [address] AT [facility]  Pharmacy Notified: Yes  Patient Notified: Yes  **Instructed pharmacy to notify patient when script is ready to /ship.**

## 2022-12-09 ENCOUNTER — PATIENT OUTREACH (OUTPATIENT)
Dept: FAMILY MEDICINE | Facility: CLINIC | Age: 57
End: 2022-12-09

## 2022-12-09 DIAGNOSIS — R87.810 CERVICAL HIGH RISK HPV (HUMAN PAPILLOMAVIRUS) TEST POSITIVE: ICD-10-CM

## 2022-12-27 ENCOUNTER — TELEPHONE (OUTPATIENT)
Dept: FAMILY MEDICINE | Facility: CLINIC | Age: 57
End: 2022-12-27

## 2022-12-27 DIAGNOSIS — R87.810 CERVICAL HIGH RISK HPV (HUMAN PAPILLOMAVIRUS) TEST POSITIVE: Primary | ICD-10-CM

## 2022-12-27 NOTE — TELEPHONE ENCOUNTER
Referral team,    Patient calling to request referral for coloposcopy to [facility]  Fax number [phone]  [place]: Jessica Silva     Pended referral for OBGYN for patient if able to do so.    Patient asking to go there because of availability to get her in for an appointment for this procedure.  RN also faxed Pap and HPV records per patient's request  Tanna MONTOYA RN

## 2022-12-27 NOTE — TELEPHONE ENCOUNTER
NIMCO.S,  Patient looking for referral, approved by referral pool.  Please advise.  Tanna MONTOYA RN

## 2022-12-27 NOTE — TELEPHONE ENCOUNTER
Prema Tran  You 6 minutes ago (11:16 AM)     EB  Good morning,   I spoke with the patient, and Chavo is in her insurance network. Ok to enter in OUTGOING referral, and I can fax it.   Thank you for reaching out,   Prema   Referral coordinator

## 2022-12-27 NOTE — TELEPHONE ENCOUNTER
Sure- done  Thanks     1. Cervical high risk HPV (human papillomavirus) test positive  - Ob/Gyn Referral; Future

## 2022-12-30 ENCOUNTER — TRANSFERRED RECORDS (OUTPATIENT)
Dept: HEALTH INFORMATION MANAGEMENT | Facility: CLINIC | Age: 57
End: 2022-12-30

## 2023-01-11 ENCOUNTER — HOSPITAL ENCOUNTER (OUTPATIENT)
Dept: MAMMOGRAPHY | Facility: CLINIC | Age: 58
Discharge: HOME | End: 2023-01-11
Attending: PHYSICIAN ASSISTANT | Admitting: PHYSICIAN ASSISTANT
Payer: COMMERCIAL

## 2023-01-11 DIAGNOSIS — Z12.31 ENCOUNTER FOR SCREENING MAMMOGRAM FOR BREAST CANCER: ICD-10-CM

## 2023-01-11 PROCEDURE — 77067 SCR MAMMO BI INCL CAD: CPT

## 2023-01-27 PROBLEM — R87.810 CERVICAL HIGH RISK HPV (HUMAN PAPILLOMAVIRUS) TEST POSITIVE: Status: ACTIVE | Noted: 2019-10-28

## 2023-05-06 DIAGNOSIS — F41.9 ANXIETY: ICD-10-CM

## 2023-05-08 NOTE — TELEPHONE ENCOUNTER
Maria Fernanda Tilley MD  [facility] [place] Triage  Dear Triage,     She has sent a request for sedative- Please call patient and help her with TE today with me- anytime that suits her.   I did have clear expectation in NOV 2022- to  [facility] care with psych regarding refill on interacting MH medications     Thanks

## 2023-05-08 NOTE — TELEPHONE ENCOUNTER
Left message for patient to call [facility] back  When patient calls back please transfer to an RN  Tanna MONTOYA RN

## 2023-05-09 RX ORDER — ZALEPLON 5 MG/1
CAPSULE ORAL
Qty: 60 CAPSULE | OUTPATIENT
Start: 2023-05-09

## 2023-05-09 NOTE — TELEPHONE ENCOUNTER
Spoke to patient  Cancelling current request  States she has established care with a psychiatrist  Who planned to refill medication going forward  Patient will call psychiatrist office for fill of medication  Will call back with any issues  Nubia MCKINNEY RN

## 2023-07-07 NOTE — TELEPHONE ENCOUNTER
FYI to provider - Patient is lost to pap tracking follow-up. Attempts to contact pt have been made per reminder process and there has been no reply and/or no appt scheduled. Contact hx listed below.     10/2019: ASC-H, HPV+ (non 16/18)  10/2019: [place] neg  06/2021: NILM HPV+ (non 16/18)  7/7/21 [place]: Benign.   11/28/22 NIL, +HR HPV, not 16/18. Plan [place] bef 2/28/23 12/9/22 Left message and MyChart letter sent / mychart read / discussed with pt by phone  1/27/23 Reminder MyChart  2/23/23 [place] not done. Tracking updated for 6 mo colp/pap due 5/28/23.   5/12/23 Reminder MyChart  6/12/23 Reminder call - left message  7/7/23 Lost to follow-up for pap tracking

## 2023-07-10 NOTE — TELEPHONE ENCOUNTER
Colposcopy completed by Dr bari Silva- 12/2022 and repeat pap due 12/2023    Psych NP- and getting MH prescription needed

## 2023-12-29 DIAGNOSIS — F41.9 ANXIETY: ICD-10-CM

## 2023-12-29 RX ORDER — DULOXETIN HYDROCHLORIDE 60 MG/1
60 CAPSULE, DELAYED RELEASE ORAL DAILY
Qty: 30 CAPSULE | Refills: 1 | Status: SHIPPED | OUTPATIENT
Start: 2023-12-29 | End: 2024-05-31

## 2024-01-27 ENCOUNTER — HEALTH MAINTENANCE LETTER (OUTPATIENT)
Age: 59
End: 2024-01-27

## 2024-02-08 ENCOUNTER — HOSPITAL ENCOUNTER (OUTPATIENT)
Dept: MAMMOGRAPHY | Facility: CLINIC | Age: 59
Discharge: HOME | End: 2024-02-08
Attending: STUDENT IN AN ORGANIZED HEALTH CARE EDUCATION/TRAINING PROGRAM | Admitting: STUDENT IN AN ORGANIZED HEALTH CARE EDUCATION/TRAINING PROGRAM
Payer: COMMERCIAL

## 2024-02-08 DIAGNOSIS — Z12.31 VISIT FOR SCREENING MAMMOGRAM: ICD-10-CM

## 2024-02-08 PROCEDURE — 77063 BREAST TOMOSYNTHESIS BI: CPT

## 2024-04-08 ENCOUNTER — OFFICE VISIT (OUTPATIENT)
Dept: INTERNAL MEDICINE | Facility: CLINIC | Age: 59
End: 2024-04-08
Payer: COMMERCIAL

## 2024-04-08 VITALS
BODY MASS INDEX: 21.32 KG/M2 | HEIGHT: 64 IN | SYSTOLIC BLOOD PRESSURE: 130 MMHG | WEIGHT: 124.9 LBS | DIASTOLIC BLOOD PRESSURE: 84 MMHG | RESPIRATION RATE: 16 BRPM | HEART RATE: 68 BPM | OXYGEN SATURATION: 97 %

## 2024-04-08 DIAGNOSIS — U09.9 POST COVID-19 CONDITION, UNSPECIFIED: Primary | ICD-10-CM

## 2024-04-08 PROCEDURE — 99213 OFFICE O/P EST LOW 20 MIN: CPT | Performed by: PHYSICIAN ASSISTANT

## 2024-04-08 ASSESSMENT — PATIENT HEALTH QUESTIONNAIRE - PHQ9
10. IF YOU CHECKED OFF ANY PROBLEMS, HOW DIFFICULT HAVE THESE PROBLEMS MADE IT FOR YOU TO DO YOUR WORK, TAKE CARE OF THINGS AT HOME, OR GET ALONG WITH OTHER PEOPLE: VERY DIFFICULT
SUM OF ALL RESPONSES TO PHQ QUESTIONS 1-9: 16
SUM OF ALL RESPONSES TO PHQ QUESTIONS 1-9: 16

## 2024-04-08 NOTE — PROGRESS NOTES
"  Assessment & Plan     Post covid-19 condition, unspecified    - Adult Post Covid Clinic  Referral; Future            Depression Screening Follow Up        4/8/2024     6:45 AM   PHQ   PHQ-9 Total Score 16   Q9: Thoughts of better off dead/self-harm past 2 weeks Not at all           Follow Up Actions Taken  Crisis resource information provided in After Visit Summary       Patient to continue with psychiatry for mental health,  Referral to the post covid clinic for eval as well     Subjective   Inna is a 58 year old, presenting for the following health issues:  Consult    History of Present Illness       Reason for visit:  Referral to long covid clinic  Symptom onset:  More than a month  Symptoms include:  Still having brain fog and depression  Symptom intensity:  Severe  Symptom progression:  Staying the same  Had these symptoms before:  No  What makes it worse:  X  What makes it better:  X    She eats 2-3 servings of fruits and vegetables daily.She consumes 0 sweetened beverage(s) daily. She exercises with enough effort to increase her heart rate 3 or less days per week.   She is taking medications regularly.     Feels like she is having foggy feeling in  the brain. Worse since Covid in the fall. She did a home test and was treated with paxlovid   Depression   - Seeing psychiatry for mental health - plan for possible Ketamine                 Review of Systems  Constitutional, HEENT, cardiovascular, pulmonary, gi and gu systems are negative, except as otherwise noted.      Objective    /84   Pulse 68   Resp 16   Ht 1.626 m (5' 4\")   Wt 56.7 kg (124 lb 14.4 oz)   SpO2 97%   BMI 21.44 kg/m    Body mass index is 21.44 kg/m .  Physical Exam   GENERAL: alert and no distress  PSYCH: mentation appears normal and affect flat            Signed Electronically by: Leanne Hogue PA-C    "

## 2024-04-29 ENCOUNTER — VIRTUAL VISIT (OUTPATIENT)
Dept: PHYSICAL MEDICINE AND REHAB | Facility: CLINIC | Age: 59
End: 2024-04-29
Attending: PHYSICIAN ASSISTANT
Payer: COMMERCIAL

## 2024-04-29 ENCOUNTER — LAB (OUTPATIENT)
Dept: LAB | Facility: CLINIC | Age: 59
End: 2024-04-29
Payer: COMMERCIAL

## 2024-04-29 VITALS — HEIGHT: 64 IN | BODY MASS INDEX: 21.17 KG/M2 | WEIGHT: 124 LBS

## 2024-04-29 DIAGNOSIS — R41.844 IMPAIRED EXECUTIVE FUNCTIONING: ICD-10-CM

## 2024-04-29 DIAGNOSIS — R41.841 COGNITIVE COMMUNICATION DEFICIT: ICD-10-CM

## 2024-04-29 DIAGNOSIS — F32.A ANXIETY AND DEPRESSION: ICD-10-CM

## 2024-04-29 DIAGNOSIS — U09.9 POST-COVID CHRONIC CONCENTRATION DEFICIT: ICD-10-CM

## 2024-04-29 DIAGNOSIS — F41.9 ANXIETY AND DEPRESSION: ICD-10-CM

## 2024-04-29 DIAGNOSIS — F51.04 CHRONIC INSOMNIA: ICD-10-CM

## 2024-04-29 DIAGNOSIS — R41.840 POST-COVID CHRONIC CONCENTRATION DEFICIT: ICD-10-CM

## 2024-04-29 DIAGNOSIS — R41.3 MEMORY LOSS: ICD-10-CM

## 2024-04-29 DIAGNOSIS — U09.9 LONG COVID: ICD-10-CM

## 2024-04-29 DIAGNOSIS — R41.3 MEMORY LOSS: Primary | ICD-10-CM

## 2024-04-29 LAB
BASOPHILS # BLD AUTO: 0.1 10E3/UL (ref 0–0.2)
BASOPHILS NFR BLD AUTO: 1 %
EOSINOPHIL # BLD AUTO: 0.1 10E3/UL (ref 0–0.7)
EOSINOPHIL NFR BLD AUTO: 1 %
ERYTHROCYTE [DISTWIDTH] IN BLOOD BY AUTOMATED COUNT: 12 % (ref 10–15)
HCT VFR BLD AUTO: 40.8 % (ref 35–47)
HGB BLD-MCNC: 13.6 G/DL (ref 11.7–15.7)
IMM GRANULOCYTES # BLD: 0 10E3/UL
IMM GRANULOCYTES NFR BLD: 0 %
LYMPHOCYTES # BLD AUTO: 2.3 10E3/UL (ref 0.8–5.3)
LYMPHOCYTES NFR BLD AUTO: 26 %
MCH RBC QN AUTO: 32.7 PG (ref 26.5–33)
MCHC RBC AUTO-ENTMCNC: 33.3 G/DL (ref 31.5–36.5)
MCV RBC AUTO: 98 FL (ref 78–100)
MONOCYTES # BLD AUTO: 0.7 10E3/UL (ref 0–1.3)
MONOCYTES NFR BLD AUTO: 7 %
NEUTROPHILS # BLD AUTO: 5.8 10E3/UL (ref 1.6–8.3)
NEUTROPHILS NFR BLD AUTO: 66 %
PLATELET # BLD AUTO: 352 10E3/UL (ref 150–450)
RBC # BLD AUTO: 4.16 10E6/UL (ref 3.8–5.2)
VIT D+METAB SERPL-MCNC: 83 NG/ML (ref 20–50)
WBC # BLD AUTO: 8.9 10E3/UL (ref 4–11)

## 2024-04-29 PROCEDURE — 85025 COMPLETE CBC W/AUTO DIFF WBC: CPT

## 2024-04-29 PROCEDURE — 82306 VITAMIN D 25 HYDROXY: CPT

## 2024-04-29 PROCEDURE — 99204 OFFICE O/P NEW MOD 45 MIN: CPT | Mod: 95 | Performed by: PHYSICIAN ASSISTANT

## 2024-04-29 PROCEDURE — 36415 COLL VENOUS BLD VENIPUNCTURE: CPT

## 2024-04-29 SDOH — SOCIAL STABILITY: SOCIAL NETWORK

## 2024-04-29 SDOH — SOCIAL STABILITY: SOCIAL NETWORK: I HAVE TROUBLE DOING ALL OF MY REGULAR LEISURE ACTIVITIES WITH OTHERS: USUALLY

## 2024-04-29 SDOH — SOCIAL STABILITY: SOCIAL NETWORK: I HAVE TROUBLE DOING ALL OF THE ACTIVITIES WITH FRIENDS THAT I WANT TO DO: USUALLY

## 2024-04-29 SDOH — SOCIAL STABILITY: SOCIAL NETWORK: I HAVE TROUBLE DOING ALL OF THE FAMILY ACTIVITIES THAT I WANT TO DO: USUALLY

## 2024-04-29 SDOH — SOCIAL STABILITY: SOCIAL NETWORK: PROMIS ABILITY TO PARTICIPATE IN SOCIAL ROLES & ACTIVITIES T-SCORE: 39

## 2024-04-29 SDOH — SOCIAL STABILITY: SOCIAL NETWORK: I HAVE TROUBLE DOING ALL OF MY USUAL WORK (INCLUDE WORK AT HOME): USUALLY

## 2024-04-29 ASSESSMENT — ANXIETY QUESTIONNAIRES
GAD7 TOTAL SCORE: 18
7. FEELING AFRAID AS IF SOMETHING AWFUL MIGHT HAPPEN: SEVERAL DAYS
3. WORRYING TOO MUCH ABOUT DIFFERENT THINGS: MORE THAN HALF THE DAYS
2. NOT BEING ABLE TO STOP OR CONTROL WORRYING: NEARLY EVERY DAY
1. FEELING NERVOUS, ANXIOUS, OR ON EDGE: NEARLY EVERY DAY
5. BEING SO RESTLESS THAT IT IS HARD TO SIT STILL: NEARLY EVERY DAY
7. FEELING AFRAID AS IF SOMETHING AWFUL MIGHT HAPPEN: SEVERAL DAYS
GAD7 TOTAL SCORE: 18
6. BECOMING EASILY ANNOYED OR IRRITABLE: NEARLY EVERY DAY
4. TROUBLE RELAXING: NEARLY EVERY DAY
GAD7 TOTAL SCORE: 18

## 2024-04-29 ASSESSMENT — PAIN SCALES - GENERAL: PAINLEVEL: NO PAIN (0)

## 2024-04-29 ASSESSMENT — PATIENT HEALTH QUESTIONNAIRE - PHQ9
10. IF YOU CHECKED OFF ANY PROBLEMS, HOW DIFFICULT HAVE THESE PROBLEMS MADE IT FOR YOU TO DO YOUR WORK, TAKE CARE OF THINGS AT HOME, OR GET ALONG WITH OTHER PEOPLE: VERY DIFFICULT
SUM OF ALL RESPONSES TO PHQ QUESTIONS 1-9: 15
SUM OF ALL RESPONSES TO PHQ QUESTIONS 1-9: 15

## 2024-04-29 NOTE — PROGRESS NOTES
Inna Phillip is a 58 year old female who presents to be evaluated for a billable video visit.    Video-Visit Details    Video visit Start time:8:04 AM    Type of service:  Video Visit    Video End Time: 8:32 AM    Originating Location (pt. Location): Home    Distant Location (provider location):  Off- Site    Platform used for Video Visit: Isaiah    Assessment/ Impression:   1. Memory loss/Impaired executive functioning/Post-COVID chronic concentration deficit/Cognitive communication deficit  Patient with history of impaired executive functioning which worsened with COVID. She has noticed short term memory, word finding difficulty, issues with increased forgetfulness, multitasking and concentrating. She does have  a family history of alzheimer's. With her past history of memory difficulties will refer to Neurology as well as have patient start speech therapy.  Discussed also working with sleep psychology for long history of chronic insomnia.  She also would benefit from working with mental health. Discussed her insomnia and mental. Discussed N-Acetylcysteine and encouraged to start at 600mg daily.  Discussed risk and side effects  - Adult Neurology  Referral; Future  - Sleep Psychology  Referral; Future  - CBC with Platelets & Differential; Future  - Vitamin D Deficiency; Future  - Speech Therapy  Referral; Future    2. Anxiety and depression  Patient with long history of anxiety and depression.  Discussed working with mental health as this will commonly exacerbate concentration difficulties.   - Adult Mental Health  Referral; Future    3. Chronic insomnia  Patient with long standing insomnia. Discussed working with sleep psychology as despite sleep medication she does not sleep through the night.  Discussed this is a risk factor for poor concentration and encouraged to schedule with sleep psychology.   - Sleep Psychology  Referral; Future    4. Long  COVID  Discussed COVID and Post COVID with patient.  Educational materials provided and all questions answered.    Plan:  I reviewed present knowledge on long-Covid.  Education was provided and question were answered.  Orders/Referrals as above  Will advised patient on test results  I will follow up with Inna Phillip in 2 months. I will review progress and consider need for any other therapeutic interventions. If there are any questions and/or concerns she will call the clinic.      On day of encounter time spent in chart review and with patient in consultation, exam, education, coordination of care, review of outside charts/data and documentation:  40 minutes     I have attempted to proof read for major spelling errors and apologize for any minor errors I may have missed.      This note was dictated using voice recognition software. Any grammatical or context distortions are unintentional and inherent to the software.    _____________  Jovita Chaidez PA-C  [facility] PHYSICAL MEDICINE AND REHABILITATION CLINIC [facility]   This 58 year old female presents to the [facility] Rehabilitation Medicine Post-COVID clinic as a new consult to evaluate continuing symptoms after COVID infection initially diagnosed 11/19/23.  Inna Phillip presented to online primary care on 11/20/23  complaining of  sore throat, congestion, cough, generalized aches and pains, fatigue, decreased taste, and decreased smell. Treatment was Paxlovid.  Inna Phillip experienced complications of concentration deficit.  Continuing symptoms include brain fog and distractibility.  Patient has had concentration difficulties for a while.  She has had trouble with her executive function for 20 years but this worsened after COVID. She has had fluctuation with her concentration depending on her mental health.  Patient is having trouble with word finding, expressing her through, processing  information, ability to remember, to focus since her COVID infection in November.   Patient uses sonata to sleep.  She sleeps for 9 hours total.  She will wake up after 4 hours and then need to take some CBD to get back to sleep.  She denies any safety concerns. Past medical history is significant for  chronic insomnia, hypothyroidism, hyperlipidemia, depression, and anxiety. The patient was vaccinated against COVID x5, last vaccine 10/24/23 .     History of COVID-19 infection: 11/19/23  Date of first symptoms: 11/19/23  Diagnosis: antigen  Hospitalization: No  Treatment: Paxlovid  Current Symptoms: See subjective  Goals of Care: improve thinking and improve quality of life          4/29/2024     7:43 AM   PHQ Assesment Total Score(s)   PHQ-9 Score 15           4/29/2024     7:44 AM   DEREK-7 Results   DEREK 7 TOTAL SCORE 18 (severe anxiety)   DEREK-7 Total Score 18         4/29/2024     7:44 AM   PTSD Screen Score   Have you ever experienced this kind of event? No         4/29/2024     7:52 AM   PROMIS-29   PROMIS Physical Function T-Score 37 (moderate dysfunction)   PROMIS Anxiety T-Score Incomplete   PROMIS Depression T-Score 69 (moderate)   PROMIS Fatigue T-Score 63 (moderate)   PROMIS Sleep Disturbance T-Score 54 (within normal limits)   PROMIS Ability to Participate in Social Roles & Activities T-Score 39 (moderate dysfunction)   PROMIS Pain Interference T-Score 59 (mild)   PROMIS Pain Intensity 3     Past Medical History:   Diagnosis Date    Anxiety     Bipolar 2 disorder (H)     Fibromyalgia     Hypothyroidism     Mixed hyperlipidemia        Past Surgical History:   Procedure Laterality Date    COLONOSCOPY  10/31/2019    ENDOMETRIAL BIOPSY  01/20/2017    TONSILLECTOMY      Vagina sugery      [place] teeth Bilateral        Family History   Problem Relation Age of Onset    Anxiety Disorder Mother     Osteoporosis Mother     Hyperlipidemia Mother     Alcoholism Father     Alzheimer Disease Father     Lung Cancer  Father     Diabetes Type 2  Father     Anxiety Disorder Maternal Grandmother     Breast Cancer Maternal Grandmother     Leukemia Maternal Grandfather     Breast Cancer Paternal Grandmother        Social History     Tobacco Use    Smoking status: Never    Smokeless tobacco: Never   Vaping Use    Vaping status: Never Used         Current Outpatient Medications:     ALPRAZolam (XANAX) 0.25 MG tablet, Take 1 tablet (0.25 mg) by mouth daily as needed for anxiety, Disp: 30 tablet, Rfl: 0    ALPRAZolam (XANAX) 0.25 MG tablet, Take 1 tablet (0.25 mg) by mouth nightly as needed for anxiety, Disp: 30 tablet, Rfl: 1    DULoxetine (CYMBALTA) 60 MG capsule, TAKE 1 CAPSULE(60 MG) BY MOUTH DAILY, Disp: 30 capsule, Rfl: 1    levothyroxine (SYNTHROID/LEVOTHROID) 125 MCG tablet, TAKE 1/2 TABLET BY MOUTH EVERY MORNING BEFORE A MEAL, Disp: , Rfl:     naltrexone (DEPADE/REVIA) 50 MG tablet, PATIENT TO MAKE UP A NALTREXONE SOLUTION PER PROVIDER DIRECTIONS. MAX DOSE OF 4.5 MG PER DAY, Disp: , Rfl:     zaleplon (SONATA) 5 MG capsule, Take 1-2 capsules (5-10 mg) by mouth At Bedtime, Disp: 60 capsule, Rfl: 1    Review of Systems   Constitutional, HEENT, cardiovascular, pulmonary, GI, , musculoskeletal, neuro, skin, endocrine and psych systems are negative, except as otherwise noted.      Objective    Vitals:  No vitals were obtained today due to virtual visit.    Physical Exam   EYES: Eyes grossly normal to inspection.  No discharge or erythema, or obvious scleral/conjunctival abnormalities.  SKIN: Visible skin clear. No significant rash, abnormal pigmentation or lesions.  NEURO: Cranial nerves grossly intact.  Mentation and speech appropriate for age.  GENERAL: Healthy, alert and no distress  RESP: No audible wheeze, cough, or visible cyanosis.  No visible retractions or increased work of breathing.    PSYCH: Mentation appears normal, affect normal/bright, judgement and insight intact, normal speech and appearance well-groomed.         "    No results found for: \"CRP\"   No results found for: \"SED\"   Last Renal Panel:  Sodium   Date Value Ref Range Status   10/28/2022 139 133 - 144 mmol/L Final     Potassium   Date Value Ref Range Status   10/28/2022 4.2 3.4 - 5.3 mmol/L Final     Chloride   Date Value Ref Range Status   10/28/2022 106 94 - 109 mmol/L Final     Carbon Dioxide (CO2)   Date Value Ref Range Status   10/28/2022 27 20 - 32 mmol/L Final     Anion Gap   Date Value Ref Range Status   10/28/2022 6 3 - 14 mmol/L Final     Glucose   Date Value Ref Range Status   10/28/2022 105 (H) 70 - 99 mg/dL Final     Urea Nitrogen   Date Value Ref Range Status   10/28/2022 16 7 - 30 mg/dL Final     Creatinine   Date Value Ref Range Status   10/28/2022 0.57 0.52 - 1.04 mg/dL Final     GFR Estimate   Date Value Ref Range Status   10/28/2022 >90 >60 mL/min/1.73m2 Final     Comment:     Effective December 21, 2021 eGFRcr in adults is calculated using the 2021 CKD-EPI creatinine equation which includes age and gender (Stephani et al., NEJM, DOI: 10.1056/ERZFig6491945)     Calcium   Date Value Ref Range Status   10/28/2022 9.5 8.5 - 10.1 mg/dL Final     Albumin   Date Value Ref Range Status   10/28/2022 4.2 3.4 - 5.0 g/dL Final      Lab Results   Component Value Date    WBC 5.7 10/28/2022     Lab Results   Component Value Date    RBC 4.05 10/28/2022     Lab Results   Component Value Date    HGB 13.4 10/28/2022     Lab Results   Component Value Date    HCT 40.4 10/28/2022     No components found for: \"MCT\"  Lab Results   Component Value Date     10/28/2022     Lab Results   Component Value Date    MCH 33.1 10/28/2022     Lab Results   Component Value Date    MCHC 33.2 10/28/2022     Lab Results   Component Value Date    RDW 12.4 10/28/2022     Lab Results   Component Value Date     10/28/2022      No results found for: \"A1C\"   TSH   Date Value Ref Range Status   08/17/2022 0.13 (L) 0.40 - 4.00 mU/L Final      No results found for: \"VITDT\"   No results " "for input(s): \"MAG\" in the last 50354 hours.  Last Comprehensive Metabolic Panel:  Sodium   Date Value Ref Range Status   10/28/2022 139 133 - 144 mmol/L Final     Potassium   Date Value Ref Range Status   10/28/2022 4.2 3.4 - 5.3 mmol/L Final     Chloride   Date Value Ref Range Status   10/28/2022 106 94 - 109 mmol/L Final     Carbon Dioxide (CO2)   Date Value Ref Range Status   10/28/2022 27 20 - 32 mmol/L Final     Anion Gap   Date Value Ref Range Status   10/28/2022 6 3 - 14 mmol/L Final     Glucose   Date Value Ref Range Status   10/28/2022 105 (H) 70 - 99 mg/dL Final     Urea Nitrogen   Date Value Ref Range Status   10/28/2022 16 7 - 30 mg/dL Final     Creatinine   Date Value Ref Range Status   10/28/2022 0.57 0.52 - 1.04 mg/dL Final     GFR Estimate   Date Value Ref Range Status   10/28/2022 >90 >60 mL/min/1.73m2 Final     Comment:     Effective December 21, 2021 eGFRcr in adults is calculated using the 2021 CKD-EPI creatinine equation which includes age and gender (Stephani et al., NEJ, DOI: 10.1056/OWFOrx6015903)     Calcium   Date Value Ref Range Status   10/28/2022 9.5 8.5 - 10.1 mg/dL Final     Bilirubin Total   Date Value Ref Range Status   10/28/2022 0.6 0.2 - 1.3 mg/dL Final     Alkaline Phosphatase   Date Value Ref Range Status   10/28/2022 62 40 - 150 U/L Final     ALT   Date Value Ref Range Status   10/28/2022 19 0 - 50 U/L Final     AST   Date Value Ref Range Status   10/28/2022 14 0 - 45 U/L Final         Imaging:    I personally reviewed the following imaging results today and those on care everywhere, if indicated     Nothing new to review    Reviewed [facility]  [facility], [facility] and [facility] sites     Medical Records Reviewed:    Reviewed consults/documents from  [facility], [facility], and [facility] including  Family Practice and Internal Medicine    "

## 2024-04-29 NOTE — LETTER
4/29/2024       RE: Inna Phillip  [address]  [place] [address]       Dear Colleague,    Thank you for referring your patient, Inna Phillip, to the [facility] PHYSICAL MEDICINE AND REHABILITATION CLINIC [place] [facility]. Please see a copy of my visit note below.    Inna Phillip is a 58 year old female who presents to be evaluated for a billable video visit.      Assessment/ Impression:   1. Memory loss/Impaired executive functioning/Post-COVID chronic concentration deficit/Cognitive communication deficit  Patient with history of impaired executive functioning which worsened with COVID. She has noticed short term memory, word finding difficulty, issues with increased forgetfulness, multitasking and concentrating. She does have  a family history of alzheimer's. With her past history of memory difficulties will refer to Neurology as well as have patient start speech therapy.  Discussed also working with sleep psychology for long history of chronic insomnia.  She also would benefit from working with mental health. Discussed her insomnia and mental. Discussed N-Acetylcysteine and encouraged to start at 600mg daily.  Discussed risk and side effects  - Adult Neurology  Referral; Future  - Sleep Psychology  Referral; Future  - CBC with Platelets & Differential; Future  - Vitamin D Deficiency; Future  - Speech Therapy  Referral; Future    2. Anxiety and depression  Patient with long history of anxiety and depression.  Discussed working with mental health as this will commonly exacerbate concentration difficulties.   - Adult Mental Health  Referral; Future    3. Chronic insomnia  Patient with long standing insomnia. Discussed working with sleep psychology as despite sleep medication she does not sleep through the night.  Discussed this is a risk factor for poor concentration and encouraged to  schedule with sleep psychology.   - Sleep Psychology  Referral; Future    4. Long COVID  Discussed COVID and Post COVID with patient.  Educational materials provided and all questions answered.    Plan:  I reviewed present knowledge on long-Covid.  Education was provided and question were answered.  Orders/Referrals as above  Will advised patient on test results  I will follow up with Inna Phillip in 2 months. I will review progress and consider need for any other therapeutic interventions. If there are any questions and/or concerns she will call the clinic.      On day of encounter time spent in chart review and with patient in consultation, exam, education, coordination of care, review of outside charts/data and documentation:  40 minutes     I have attempted to proof read for major spelling errors and apologize for any minor errors I may have missed.      This note was dictated using voice recognition software. Any grammatical or context distortions are unintentional and inherent to the software.    _____________  Jovita Chaidez PA-C  [facility] PHYSICAL MEDICINE AND REHABILITATION CLINIC [facility]   This 58 year old female presents to the [facility] Rehabilitation Medicine Post-COVID clinic as a new consult to evaluate continuing symptoms after COVID infection initially diagnosed 11/19/23.  Inna Phillip presented to online primary care on 11/20/23  complaining of  sore throat, congestion, cough, generalized aches and pains, fatigue, decreased taste, and decreased smell. Treatment was Paxlovid.  Inna Phillip experienced complications of concentration deficit.  Continuing symptoms include brain fog and distractibility.  Patient has had concentration difficulties for a while.  She has had trouble with her executive function for 20 years but this worsened after COVID. She has had fluctuation with her concentration depending on her mental health.   Patient is having trouble with word finding, expressing her through, processing information, ability to remember, to focus since her COVID infection in November.   Patient uses sonata to sleep.  She sleeps for 9 hours total.  She will wake up after 4 hours and then need to take some CBD to get back to sleep.  She denies any safety concerns. Past medical history is significant for  chronic insomnia, hypothyroidism, hyperlipidemia, depression, and anxiety. The patient was vaccinated against COVID x5, last vaccine 10/24/23 .     History of COVID-19 infection: 11/19/23  Date of first symptoms: 11/19/23  Diagnosis: antigen  Hospitalization: No  Treatment: Paxlovid  Current Symptoms: See subjective  Goals of Care: improve thinking and improve quality of life          4/29/2024     7:43 AM   PHQ Assesment Total Score(s)   PHQ-9 Score 15           4/29/2024     7:44 AM   DEREK-7 Results   DEREK 7 TOTAL SCORE 18 (severe anxiety)   DEREK-7 Total Score 18         4/29/2024     7:44 AM   PTSD Screen Score   Have you ever experienced this kind of event? No         4/29/2024     7:52 AM   PROMIS-29   PROMIS Physical Function T-Score 37 (moderate dysfunction)   PROMIS Anxiety T-Score Incomplete   PROMIS Depression T-Score 69 (moderate)   PROMIS Fatigue T-Score 63 (moderate)   PROMIS Sleep Disturbance T-Score 54 (within normal limits)   PROMIS Ability to Participate in Social Roles & Activities T-Score 39 (moderate dysfunction)   PROMIS Pain Interference T-Score 59 (mild)   PROMIS Pain Intensity 3     Past Medical History:   Diagnosis Date    Anxiety     Bipolar 2 disorder (H)     Fibromyalgia     Hypothyroidism     Mixed hyperlipidemia        Past Surgical History:   Procedure Laterality Date    COLONOSCOPY  10/31/2019    ENDOMETRIAL BIOPSY  01/20/2017    TONSILLECTOMY      Vagina sugery      [place] teeth Bilateral        Family History   Problem Relation Age of Onset    Anxiety Disorder Mother     Osteoporosis Mother     Hyperlipidemia  Mother     Alcoholism Father     Alzheimer Disease Father     Lung Cancer Father     Diabetes Type 2  Father     Anxiety Disorder Maternal Grandmother     Breast Cancer Maternal Grandmother     Leukemia Maternal Grandfather     Breast Cancer Paternal Grandmother        Social History     Tobacco Use    Smoking status: Never    Smokeless tobacco: Never   Vaping Use    Vaping status: Never Used         Current Outpatient Medications:     ALPRAZolam (XANAX) 0.25 MG tablet, Take 1 tablet (0.25 mg) by mouth daily as needed for anxiety, Disp: 30 tablet, Rfl: 0    ALPRAZolam (XANAX) 0.25 MG tablet, Take 1 tablet (0.25 mg) by mouth nightly as needed for anxiety, Disp: 30 tablet, Rfl: 1    DULoxetine (CYMBALTA) 60 MG capsule, TAKE 1 CAPSULE(60 MG) BY MOUTH DAILY, Disp: 30 capsule, Rfl: 1    levothyroxine (SYNTHROID/LEVOTHROID) 125 MCG tablet, TAKE 1/2 TABLET BY MOUTH EVERY MORNING BEFORE A MEAL, Disp: , Rfl:     naltrexone (DEPADE/REVIA) 50 MG tablet, PATIENT TO MAKE UP A NALTREXONE SOLUTION PER PROVIDER DIRECTIONS. MAX DOSE OF 4.5 MG PER DAY, Disp: , Rfl:     zaleplon (SONATA) 5 MG capsule, Take 1-2 capsules (5-10 mg) by mouth At Bedtime, Disp: 60 capsule, Rfl: 1    Review of Systems   Constitutional, HEENT, cardiovascular, pulmonary, GI, , musculoskeletal, neuro, skin, endocrine and psych systems are negative, except as otherwise noted.      Objective    Vitals:  No vitals were obtained today due to virtual visit.    Physical Exam   EYES: Eyes grossly normal to inspection.  No discharge or erythema, or obvious scleral/conjunctival abnormalities.  SKIN: Visible skin clear. No significant rash, abnormal pigmentation or lesions.  NEURO: Cranial nerves grossly intact.  Mentation and speech appropriate for age.  GENERAL: Healthy, alert and no distress  RESP: No audible wheeze, cough, or visible cyanosis.  No visible retractions or increased work of breathing.    PSYCH: Mentation appears normal, affect normal/bright, judgement  "and insight intact, normal speech and appearance well-groomed.            No results found for: \"CRP\"   No results found for: \"SED\"   Last Renal Panel:  Sodium   Date Value Ref Range Status   10/28/2022 139 133 - 144 mmol/L Final     Potassium   Date Value Ref Range Status   10/28/2022 4.2 3.4 - 5.3 mmol/L Final     Chloride   Date Value Ref Range Status   10/28/2022 106 94 - 109 mmol/L Final     Carbon Dioxide (CO2)   Date Value Ref Range Status   10/28/2022 27 20 - 32 mmol/L Final     Anion Gap   Date Value Ref Range Status   10/28/2022 6 3 - 14 mmol/L Final     Glucose   Date Value Ref Range Status   10/28/2022 105 (H) 70 - 99 mg/dL Final     Urea Nitrogen   Date Value Ref Range Status   10/28/2022 16 7 - 30 mg/dL Final     Creatinine   Date Value Ref Range Status   10/28/2022 0.57 0.52 - 1.04 mg/dL Final     GFR Estimate   Date Value Ref Range Status   10/28/2022 >90 >60 mL/min/1.73m2 Final     Comment:     Effective December 21, 2021 eGFRcr in adults is calculated using the 2021 CKD-EPI creatinine equation which includes age and gender (Stephani et al., NEJ, DOI: 10.1056/JCWXig6879172)     Calcium   Date Value Ref Range Status   10/28/2022 9.5 8.5 - 10.1 mg/dL Final     Albumin   Date Value Ref Range Status   10/28/2022 4.2 3.4 - 5.0 g/dL Final      Lab Results   Component Value Date    WBC 5.7 10/28/2022     Lab Results   Component Value Date    RBC 4.05 10/28/2022     Lab Results   Component Value Date    HGB 13.4 10/28/2022     Lab Results   Component Value Date    HCT 40.4 10/28/2022     No components found for: \"MCT\"  Lab Results   Component Value Date     10/28/2022     Lab Results   Component Value Date    MCH 33.1 10/28/2022     Lab Results   Component Value Date    MCHC 33.2 10/28/2022     Lab Results   Component Value Date    RDW 12.4 10/28/2022     Lab Results   Component Value Date     10/28/2022      No results found for: \"A1C\"   TSH   Date Value Ref Range Status   08/17/2022 0.13 (L) " "0.40 - 4.00 mU/L Final      No results found for: \"VITDT\"   No results for input(s): \"MAG\" in the last 39530 hours.  Last Comprehensive Metabolic Panel:  Sodium   Date Value Ref Range Status   10/28/2022 139 133 - 144 mmol/L Final     Potassium   Date Value Ref Range Status   10/28/2022 4.2 3.4 - 5.3 mmol/L Final     Chloride   Date Value Ref Range Status   10/28/2022 106 94 - 109 mmol/L Final     Carbon Dioxide (CO2)   Date Value Ref Range Status   10/28/2022 27 20 - 32 mmol/L Final     Anion Gap   Date Value Ref Range Status   10/28/2022 6 3 - 14 mmol/L Final     Glucose   Date Value Ref Range Status   10/28/2022 105 (H) 70 - 99 mg/dL Final     Urea Nitrogen   Date Value Ref Range Status   10/28/2022 16 7 - 30 mg/dL Final     Creatinine   Date Value Ref Range Status   10/28/2022 0.57 0.52 - 1.04 mg/dL Final     GFR Estimate   Date Value Ref Range Status   10/28/2022 >90 >60 mL/min/1.73m2 Final     Comment:     Effective December 21, 2021 eGFRcr in adults is calculated using the 2021 CKD-EPI creatinine equation which includes age and gender (Stephani et al., [facility], DOI: 10.1056/EDDNge5224096)     Calcium   Date Value Ref Range Status   10/28/2022 9.5 8.5 - 10.1 mg/dL Final     Bilirubin Total   Date Value Ref Range Status   10/28/2022 0.6 0.2 - 1.3 mg/dL Final     Alkaline Phosphatase   Date Value Ref Range Status   10/28/2022 62 40 - 150 U/L Final     ALT   Date Value Ref Range Status   10/28/2022 19 0 - 50 U/L Final     AST   Date Value Ref Range Status   10/28/2022 14 0 - 45 U/L Final         Imaging:    I personally reviewed the following imaging results today and those on care everywhere, if indicated     Nothing new to review    [facility]ed [facility] from  [facility], [facility] and [facility] sites     Medical Records Reviewed:    Reviewed consults/documents from  [facility], [facility], and [facility] including  Family Practice and Internal Medicine        Again, thank you for " allowing me to participate in the care of your patient.      Sincerely,    Jovita Chaidez PA-C

## 2024-04-29 NOTE — NURSING NOTE
Is the patient currently in the state of [place]? YES    Visit mode:VIDEO    If the visit is dropped, the patient can be reconnected by: VIDEO VISIT: Text to cell phone:   Telephone Information:   Mobile [phone]       Will anyone else be joining the visit? NO  (If patient encounters technical issues they should call [phone]    How would you like to obtain your AVS? MyChart    Are changes needed to the allergy or medication list? No    Are refills needed on medications prescribed by this physician? YES    Reason for visit: Consult    Rosemary Avendaño VVF    Depression Response    Patient completed the PHQ-9 assessment for depression and scored >9? Yes  Question 9 on the PHQ-9 was positive for suicidality? No  Does patient have current mental health provider? Yes    Is this a virtual visit? Yes   Does patient have suicidal ideation (positive question 9)? No - offer to place Mental Health Referral.  Patient declined referral/not needed    I personally notified the following: visit provider

## 2024-04-29 NOTE — PATIENT INSTRUCTIONS
To DoL   Labs for concentration/ memory  Neurology referral   Sleep psychology for chronic insomnia  Speech therapy   Mental health referral  Follow up in 2 months    Post COVID Self Care Suggestions:     Fatigue Management:       https://www.archives-pmr.org/action/showPdf?ytr=-[email or web address]%[email or web address]       Self Care:      https://fibroguide.med.[facility]/pain-care/self-care/  Recovery World Health Organization:    https://apps.who.int/iris/bitstream/handle/04099/361188/VYK-PXOQ-4877-557-36737-02555-eng.pdf  Breathing exercises:    https://www.[facility].org/health/conditions-and-diseases/coronavirus/coronavirus-recovery-breathing-exercises      Assessment of sense of smell and taste    Smell training:  Flowery - Melissa  Fruity - Lemon  Spicy - Cloves  Resinous - Eucalyptus      1 - Pour a few droplets of one of the oils on to a cotton pad or ball  2 - Do not try to sniff the pad immediately; leave it for a few minutes for the fragrance to develop  3 - Hold the first stick/pad up to your nose, about an inch away.  The order in which you test the oils does not matter  4 - Relax and try to inhale naturally through the nose - sniffing too quickly and deeply is likely to result in you not being able to detect anything  5 - Try this a couple more times, then rest for five minutes  6 - Move on to the next oil and repeat as above.    After three months switch to a new set of odors: menthol, thyme, tangerine, and lisa, training with them twice daily.    After another three months, switch to a third new set of odors: green tea, bergamot, rosemary, and jocelin, again training with them twice daily.    Studies:   [employer] Paxlovid [employer]  https://medicine.alyce.edu/cii/research/merry-study/?mibextid=Zxz2cZ    Cognitive Post-COVID study  z.[facility]/covid-ema    Supplements:  Fatigue- COQ10 100mg 3x day  ( side effects GI)  Brain fog-N-acetylcysteine 600 mg daily (side effects GI)

## 2024-04-29 NOTE — TELEPHONE ENCOUNTER
RECORDS RECEIVED FROM: Internal    REASON FOR VISIT: Memory care   PROVIDER: Usman Hernandez DO   DATE OF APPT: 5/23/24 @ 9:00 am    NOTES (FOR ALL VISITS) STATUS DETAILS   OFFICE NOTE from referring provider Internal 4/29/24 Jovita Chaidez PA-C @[facility]-PM&R     OFFICE NOTE from other specialist Internal 4/8/24 Leanne Hogue PA-C @[facility]     MEDICATION LIST Internal    IMAGING  (FOR ALL VISITS)     MRI (HEAD, NECK, SPINE) N/A    CT (HEAD, NECK, SPINE) N/A

## 2024-05-23 ENCOUNTER — PRE VISIT (OUTPATIENT)
Dept: NEUROLOGY | Facility: CLINIC | Age: 59
End: 2024-05-23

## 2024-05-23 ENCOUNTER — VIRTUAL VISIT (OUTPATIENT)
Dept: NEUROLOGY | Facility: CLINIC | Age: 59
End: 2024-05-23
Attending: PHYSICIAN ASSISTANT
Payer: COMMERCIAL

## 2024-05-23 DIAGNOSIS — R41.841 COGNITIVE COMMUNICATION DEFICIT: ICD-10-CM

## 2024-05-23 DIAGNOSIS — R41.840 POST-COVID CHRONIC CONCENTRATION DEFICIT: ICD-10-CM

## 2024-05-23 DIAGNOSIS — R41.3 MEMORY LOSS: ICD-10-CM

## 2024-05-23 DIAGNOSIS — R41.844 IMPAIRED EXECUTIVE FUNCTIONING: ICD-10-CM

## 2024-05-23 DIAGNOSIS — U09.9 POST-COVID CHRONIC CONCENTRATION DEFICIT: ICD-10-CM

## 2024-05-23 PROCEDURE — 99205 OFFICE O/P NEW HI 60 MIN: CPT | Mod: 95 | Performed by: PSYCHIATRY & NEUROLOGY

## 2024-05-23 ASSESSMENT — PATIENT HEALTH QUESTIONNAIRE - PHQ9: SUM OF ALL RESPONSES TO PHQ QUESTIONS 1-9: 18

## 2024-05-23 NOTE — LETTER
5/23/2024       RE: Inan Phillip  [address] [facility] [address]       Dear Colleague,    Thank you for referring your patient, Inna Phillip, to the [facility]UROLOG[facility] [place] [facility]. Please see a copy of my visit note below.    [facility] Neurology Consultation    Inna Phillip MRN# [id number]   Age: 58 year old YOB: 1965     Requesting physician: Candie Santana     Reason for Consultation: memory concerns      History of Presenting Symptoms:   Inna Phillip is a 58 year old female who presents today for evaluation of memory concerns. The patient has a pertinent medical history of Bipolar II (she indicates this is present anymore and that she deals with depression now), DEREK, fibromyalgia, HLD, Hypothyroidism, insomnia.      The patient was seen with her PA-C in PM&R 4/29/2024 for general health check up and post-COVID related symptoms.  During the visit, she mentioned having chronic insomnia, mental health disorder, and a family history of Alzheimer's disease.  She was noting having worsened short term memory, concentration, multitasking following having COVID and prior to that for 20+ years.  Things seemed to worsen around 11/2023 when having COVID.  GAD7 screen was high, PHQ was high (15), no cognitive screen was done.  She was referred to sleep psychology, adult mental health, and neurology following that visit.    Today, the patient reports being ill in 9/2023 but didn't test positive for COVID. In 10/2023, she was in [place] and got sick there, eventually finding out she had COVID at that time. She had Paxlovid treatment.  Since that time, she has had problems with recall, brain fog, and executive functioning.  She also expresses worsened body pain, and worsened depression.      She tells me 20-15 years ago she had cognitive testing done through [facility].  She indicates that doctors told her there was some loss of executive function but there was no signs of dementing illness or an etiology to speak of.  It ws thought some of her alcohol use may contribute to it.  She did not have a brain scan.    She hasn't necessarily noted a functional decline in her overall abilities (daily life tasks) until getting sick.  She is planning on attending mental health referrals made, and she has a psychiatrist to aide.     She has family history of dementia, indicating her father was diagnosed with Alzheimer's disease (likely in his early 60's or so). She also mentions he had TIA.      Social History:   Bachelors science.  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 Alcohol use is cut greatly since 1/2024 (a drink a night).  Prior to that she was using alcohol to numb feelings and function. She never experienced alcohol.  No recreational substance. She never had learning disability and didn't struggle in school.       Medications:   Xanax 0.25 mg every day PRN, at bedtime PRN  Duloxetine 60 mg every day  Levothyroxine  Zaleplon at bedtime  Naltrexone (see 11/28/2022 notes. Advised to visit AA to reduce alcohol use, naltrexone considered but deferred).     Physical Exam:   General: Seated comfortably in no acute distress.  HEENT: Neck supple with normal range of motion.   Skin: No rashes  Neurologic:     Mental Status: Fully alert, attentive and oriented. Speech clear and fluent, no paraphasic errors.  MiniCOG was 2/5 (clock is appropriate. Recalls 0/3 word with recall season and leader with category clue while she recalls table with multiple choice).  TICS30 was 25/30 (6/10 instant recall, serial 7's with one incorrect response).  Says 14 words that start with F in one minute. Spells WORLD backward. 1.25 is 5 quarters.      Cranial Nerves: EOMI with normal smooth pursuit. Facial movements symmetric. Hearing not  formally tested but intact to conversation.  No dysarthria.     Motor: No tremors or other abnormal movements observed.      Gait: Normal, steady casual gait.         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  While certainly depression and poor sleep can contribute to her current issues, I am not certain they are the entire picture given the acuity of her symptoms. COVID can lead to increased risk for hypercoagulation, and it may be helpful to obtain an MRI to look for structural injury or atrophy.  A baseline MoCA or SLUMS may be helpful as well to determine if new neuropsychology testing is warranted.  While gathering new information, the patient will also work on issues of depression and sleep to see if managing these issues improves her current cognitive profile.  We did spend time discussing the genetics of certain dementias, and that her clinical history is actually reassuring for lack of major degrees of progression in cognitive abilities over a 15 year period.       Plan:  MRI brain w/wout contrast  OT for MoCA or SUMS  Could consider other serum studies moving forward (B1, B12, etc).    Follow up in Neurology clinic in 2 months (in-person), or should new concerns arise.      Total time today (62 min) in this patient encounter was spent on pre-charting, counseling and/or coordination of care.  The patient is in agreement with this plan and has no further questions.          Again, thank you for allowing me to participate in the care of your patient.       Sincerely,    Usman Hernandez, DO

## 2024-05-23 NOTE — PROGRESS NOTES
Virtual Visit Details    Type of service:  Video Visit     Originating Location (pt. Location): Home    Distant Location (provider location):  On-site  Platform used for Video Visit: Isaiah

## 2024-05-23 NOTE — PROGRESS NOTES
[facility] Neurology Consultation    Inna Phillip MRN# [id number]   Age: 58 year old YOB: 1965     Requesting physician: Candie Santana     Reason for Consultation: memory concerns      History of Presenting Symptoms:   Inna Phillip is a 58 year old female who presents today for evaluation of memory concerns. The patient has a pertinent medical history of Bipolar II (she indicates this is present anymore and that she deals with depression now), DEREK, fibromyalgia, HLD, Hypothyroidism, insomnia.      The patient was seen with her PA-C in PM&R 4/29/2024 for general health check up and post-COVID related symptoms.  During the visit, she mentioned having chronic insomnia, mental health disorder, and a family history of Alzheimer's disease.  She was noting having worsened short term memory, concentration, multitasking following having COVID and prior to that for 20+ years.  Things seemed to worsen around 11/2023 when having COVID.  GAD7 screen was high, PHQ was high (15), no cognitive screen was done.  She was referred to sleep psychology, adult mental health, and neurology following that visit.    Today, the patient reports being ill in 9/2023 but didn't test positive for COVID. In 10/2023, she was in [place] and got sick there, eventually finding out she had COVID at that time. She had Paxlovid treatment.  Since that time, she has had problems with recall, brain fog, and executive functioning.  She also expresses worsened body pain, and worsened depression.      She tells me 20-15 years ago she had cognitive testing done through [facility].  She indicates that doctors told her there was some loss of executive function but there was no signs of dementing illness or an etiology to speak of.  It ws thought some of her alcohol use may contribute to it.  She did not have a brain scan.    She hasn't necessarily noted a functional decline in her overall  abilities (daily life tasks) until getting sick.  She is planning on attending mental health referrals made, and she has a psychiatrist to aide.     She has family history of dementia, indicating her father was diagnosed with Alzheimer's disease (likely in his early 60's or so). She also mentions he had TIA.      Social History:   Bachelors science.  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 Alcohol use is cut greatly since 1/2024 (a drink a night).  Prior to that she was using alcohol to numb feelings and function. She never experienced alcohol.  No recreational substance. She never had learning disability and didn't struggle in school.       Medications:   Xanax 0.25 mg every day PRN, at bedtime PRN  Duloxetine 60 mg every day  Levothyroxine  Zaleplon at bedtime  Naltrexone (see 11/28/2022 notes. Advised to visit AA to reduce alcohol use, naltrexone considered but deferred).     Physical Exam:   General: Seated comfortably in no acute distress.  HEENT: Neck supple with normal range of motion.   Skin: No rashes  Neurologic:     Mental Status: Fully alert, attentive and oriented. Speech clear and fluent, no paraphasic errors.  MiniCOG was 2/5 (clock is appropriate. Recalls 0/3 word with recall season and leader with category clue while she recalls table with multiple choice).  TICS30 was 25/30 (6/10 instant recall, serial 7's with one incorrect response).  Says 14 words that start with F in one minute. Spells WORLD backward. 1.25 is 5 quarters.      Cranial Nerves: EOMI with normal smooth pursuit. Facial movements symmetric. Hearing not formally tested but intact to conversation.  No dysarthria.     Motor: No tremors or other abnormal movements observed.      Gait: Normal, steady casual gait.         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  While certainly depression and poor sleep can contribute to her current issues, I am not certain they are the entire picture given the acuity of her symptoms. COVID can lead to increased risk for hypercoagulation, and it may be helpful to obtain an MRI to look for structural injury or atrophy.  A baseline MoCA or SLUMS may be helpful as well to determine if new neuropsychology testing is warranted.  While gathering new information, the patient will also work on issues of depression and sleep to see if managing these issues improves her current cognitive profile.  We did spend time discussing the genetics of certain dementias, and that her clinical history is actually reassuring for lack of major degrees of progression in cognitive abilities over a 15 year period.       Plan:  MRI brain w/wout contrast  OT for MoCA or SUMS  Could consider other serum studies moving forward (B1, B12, etc).    Follow up in Neurology clinic in 2 months (in-person), or should new concerns arise.    CRISTIAN Hernandez D.O.   of Neurology    Total time today (62 min) in this patient encounter was spent on pre-charting, counseling and/or coordination of care.  The patient is in agreement with this plan and has no further questions.

## 2024-05-23 NOTE — NURSING NOTE
Depression Response    Patient completed the PHQ-9 assessment for depression and scored >9? Yes  Question 9 on the PHQ-9 was positive for suicidality? Yes  Does patient have current mental health provider? Yes    Is this a virtual visit? Yes   Does patient have suicidal ideation (positive question 9)? Yes (adult) - transfer to Red Flag Triage ([phone]) Patient declined transfer.  Notify provider.     I personally notified the following: visit provider       Is the patient currently in the state of [place]? YES    Visit mode:VIDEO    If the visit is dropped, the patient can be reconnected by: VIDEO VISIT: Text to cell phone:   Telephone Information:   Mobile [phone]       Will anyone else be joining the visit? NO  (If patient encounters technical issues they should call [phone])    How would you like to obtain your AVS? MyChart    Are changes needed to the allergy or medication list? No    Are refills needed on medications prescribed by this physician? NO    Reason for visit: Consult    Quin MALHOTRA

## 2024-05-23 NOTE — PATIENT INSTRUCTIONS
I do think further testing is warranted to see if there is indeed something structurally changed about your brain that would cause your changes in mentation.    However, you should still work on improving your sleep and mental health, as both insomnia and depression can impact cognition to a great degree.     We can use your imaging and baseline OT evaluation to better know if advanced testing (neuropsychology, PET scan, CSF studies, serum studies) are needed.

## 2024-07-01 ENCOUNTER — VIRTUAL VISIT (OUTPATIENT)
Dept: PHYSICAL MEDICINE AND REHAB | Facility: CLINIC | Age: 59
End: 2024-07-01
Payer: COMMERCIAL

## 2024-07-01 DIAGNOSIS — R41.3 MEMORY LOSS: Primary | ICD-10-CM

## 2024-07-01 DIAGNOSIS — Z86.16 POST-COVID SYNDROME RESOLVED: ICD-10-CM

## 2024-07-01 DIAGNOSIS — R41.844 IMPAIRED EXECUTIVE FUNCTIONING: ICD-10-CM

## 2024-07-01 DIAGNOSIS — F51.04 CHRONIC INSOMNIA: ICD-10-CM

## 2024-07-01 PROCEDURE — 99213 OFFICE O/P EST LOW 20 MIN: CPT | Mod: 95 | Performed by: PHYSICIAN ASSISTANT

## 2024-07-01 SDOH — SOCIAL STABILITY: SOCIAL NETWORK: I HAVE TROUBLE DOING ALL OF THE FAMILY ACTIVITIES THAT I WANT TO DO: NEVER

## 2024-07-01 SDOH — SOCIAL STABILITY: SOCIAL NETWORK

## 2024-07-01 SDOH — SOCIAL STABILITY: SOCIAL NETWORK: I HAVE TROUBLE DOING ALL OF THE ACTIVITIES WITH FRIENDS THAT I WANT TO DO: NEVER

## 2024-07-01 SDOH — SOCIAL STABILITY: SOCIAL NETWORK: PROMIS ABILITY TO PARTICIPATE IN SOCIAL ROLES & ACTIVITIES T-SCORE: 64

## 2024-07-01 SDOH — SOCIAL STABILITY: SOCIAL NETWORK: I HAVE TROUBLE DOING ALL OF MY REGULAR LEISURE ACTIVITIES WITH OTHERS: NEVER

## 2024-07-01 SDOH — SOCIAL STABILITY: SOCIAL NETWORK: I HAVE TROUBLE DOING ALL OF MY USUAL WORK (INCLUDE WORK AT HOME): NEVER

## 2024-07-01 ASSESSMENT — PATIENT HEALTH QUESTIONNAIRE - PHQ9
SUM OF ALL RESPONSES TO PHQ QUESTIONS 1-9: 0
10. IF YOU CHECKED OFF ANY PROBLEMS, HOW DIFFICULT HAVE THESE PROBLEMS MADE IT FOR YOU TO DO YOUR WORK, TAKE CARE OF THINGS AT HOME, OR GET ALONG WITH OTHER PEOPLE: NOT DIFFICULT AT ALL
SUM OF ALL RESPONSES TO PHQ QUESTIONS 1-9: 0

## 2024-07-01 ASSESSMENT — ANXIETY QUESTIONNAIRES
GAD7 TOTAL SCORE: 3
IF YOU CHECKED OFF ANY PROBLEMS ON THIS QUESTIONNAIRE, HOW DIFFICULT HAVE THESE PROBLEMS MADE IT FOR YOU TO DO YOUR WORK, TAKE CARE OF THINGS AT HOME, OR GET ALONG WITH OTHER PEOPLE: NOT DIFFICULT AT ALL
6. BECOMING EASILY ANNOYED OR IRRITABLE: SEVERAL DAYS
7. FEELING AFRAID AS IF SOMETHING AWFUL MIGHT HAPPEN: NOT AT ALL
3. WORRYING TOO MUCH ABOUT DIFFERENT THINGS: NOT AT ALL
5. BEING SO RESTLESS THAT IT IS HARD TO SIT STILL: NOT AT ALL
GAD7 TOTAL SCORE: 3
2. NOT BEING ABLE TO STOP OR CONTROL WORRYING: NOT AT ALL
8. IF YOU CHECKED OFF ANY PROBLEMS, HOW DIFFICULT HAVE THESE MADE IT FOR YOU TO DO YOUR WORK, TAKE CARE OF THINGS AT HOME, OR GET ALONG WITH OTHER PEOPLE?: NOT DIFFICULT AT ALL
7. FEELING AFRAID AS IF SOMETHING AWFUL MIGHT HAPPEN: NOT AT ALL
1. FEELING NERVOUS, ANXIOUS, OR ON EDGE: SEVERAL DAYS
4. TROUBLE RELAXING: SEVERAL DAYS
GAD7 TOTAL SCORE: 3

## 2024-07-01 NOTE — PROGRESS NOTES
Inna Phillip is a 58 year old female who presents to be evaluated for a billable video visit.    Audio-Visit Details    Audio only video time: 11 minutes    Type of service:  Video Visit    Originating Location (pt. Location): Home    Distant Location (provider location):  Off- Site    Platform used for Video Visit: [employer]    Assessment/Impression   1. Memory loss/Impaired executive functioning  Patient with history of impaired executive functioning which worsened with COVID. Patient feels her memory is back to her baseline now.  She is working with Neurology and has MRI scheduled for later this month.      2. Chronic insomnia  Patient with long standing insomnia.  Discussed again scheduling with Sleep psychology and provided number for patient.  Discussed this can cause memory deficits long term.      3. Post COVID syndrome resolved  Patient with return of symptoms to her baseline. Discussed returning if symptoms return.    Plan:  I reviewed present knowledge on long-Covid.  Education was provided and question were answered.  Orders/Referrals as above  I will advised patient on test results  I will follow up with Inna Phillip in as need. I will review progress and consider need for any other therapeutic interventions. If there are any questions and/or concerns she will call the clinic.      On day of encounter time spent in chart review and with patient in consultation, exam, education,coordination of care,  review of outside charts/documentation and documentation:  21 minutes     I have attempted to proof read for major spelling errors and apologize for any minor errors I may have missed.     This note was dictated using voice recognition software. Any grammatical or context distortions are unintentional and inherent to the software.  _________________________________  Jovita Chaidez PA-C  [facility] PHYSICAL MEDICINE AND REHABILITATION CLINIC [facility]   Patient  returns for a follow up.  Briefly This 58 year old female presents to the [facility] Rehabilitation Medicine Post-COVID clinic as a new consult on 4/29/24 to evaluate continuing symptoms after COVID infection initially diagnosed 11/19/23.  Inna Phillip presented to [facility] primary care on 11/20/23  complaining of  sore throat, congestion, cough, generalized aches and pains, fatigue, decreased taste, and decreased smell. Treatment was Paxlovid.  Inna Phillip experienced complications of concentration deficit.  Continuing symptoms include brain fog and distractibility.  Today she returns for a follow up. She was seen by internal medicine at [place] for mutliple symtoms including her depression, isomnia, fatigue and volvodyina.  She had extensive testing and was referred to the fibromyalgia clinic in [place].  She is back to her baseline for her memory and was prescribed Rexulti for 3 week by her mental health provider.  Her scores for her depression/anxiety have normalized.      Current concerns: Health Concerns        7/1/2024     9:22 AM   PHQ Assesment Total Score(s)   PHQ-9 Score 0           7/1/2024     9:24 AM   DEREK-7 Results   DEREK 7 TOTAL SCORE 3 (minimal anxiety)   DEREK-7 Total Score 3         7/1/2024     9:24 AM   PTSD Screen Score   Have you ever experienced this kind of event? No         7/1/2024     9:28 AM   PROMIS-29   PROMIS Physical Function T-Score 57 (within normal limits)   PROMIS Anxiety T-Score 58 (mild)   PROMIS Depression T-Score 41 (within normal limits)   PROMIS Fatigue T-Score 43 (within normal limits)   PROMIS Sleep Disturbance T-Score 62 (moderate)   PROMIS Ability to Participate in Social Roles & Activities T-Score 64 (within normal limits)   PROMIS Pain Interference T-Score 76 (severe)   PROMIS Pain Intensity 2       Past Medical History:   Diagnosis Date    Anxiety     Bipolar 2 disorder (H)     Fibromyalgia     Hypothyroidism     Mixed hyperlipidemia        Past  Surgical History:   Procedure Laterality Date    COLONOSCOPY  10/31/2019    ENDOMETRIAL BIOPSY  01/20/2017    TONSILLECTOMY      Vagina sugery      [place] teeth Bilateral        Family History   Problem Relation Age of Onset    Anxiety Disorder Mother     Osteoporosis Mother     Hyperlipidemia Mother     Alcoholism Father     Alzheimer Disease Father     Lung Cancer Father     Diabetes Type 2  Father     Anxiety Disorder Maternal Grandmother     Breast Cancer Maternal Grandmother     Leukemia Maternal Grandfather     Breast Cancer Paternal Grandmother        Social History     Tobacco Use    Smoking status: Never    Smokeless tobacco: Never   Vaping Use    Vaping status: Never Used         Current Outpatient Medications:     ALPRAZolam (XANAX) 0.25 MG tablet, Take 1 tablet (0.25 mg) by mouth daily as needed for anxiety, Disp: 30 tablet, Rfl: 0    ALPRAZolam (XANAX) 0.25 MG tablet, Take 1 tablet (0.25 mg) by mouth nightly as needed for anxiety, Disp: 30 tablet, Rfl: 1    DULoxetine (CYMBALTA) 60 MG capsule, TAKE 1 CAPSULE BY MOUTH DAILY, Disp: 30 capsule, Rfl: 1    levothyroxine (SYNTHROID/LEVOTHROID) 125 MCG tablet, TAKE 1/2 TABLET BY MOUTH EVERY MORNING BEFORE A MEAL, Disp: , Rfl:     naltrexone (DEPADE/REVIA) 50 MG tablet, PATIENT TO MAKE UP A NALTREXONE SOLUTION PER PROVIDER DIRECTIONS. MAX DOSE OF 4.5 MG PER DAY, Disp: , Rfl:     zaleplon (SONATA) 5 MG capsule, Take 1-2 capsules (5-10 mg) by mouth At Bedtime, Disp: 60 capsule, Rfl: 1    Review of Systems   Constitutional, HEENT, cardiovascular, pulmonary, gi and gu systems are negative, except as otherwise noted.      Objective         Vitals:  No vitals were obtained today due to virtual visit.    Physical Exam   EYES: Eyes grossly normal to inspection.  No discharge or erythema, or obvious scleral/conjunctival abnormalities.  SKIN: Visible skin clear. No significant rash, abnormal pigmentation or lesions.  NEURO: Cranial nerves grossly intact.  Mentation and  speech appropriate for age.  GENERAL: Healthy, alert and no distress  RESP: No audible wheeze, cough, or visible cyanosis.  No visible retractions or increased work of breathing.    PSYCH: Mentation appears normal, affect normal/bright, judgement and insight intact, normal speech and appearance well-groomed.      Labs  Lab on 04/29/2024   Component Date Value Ref Range Status    Vitamin D, Total (25-Hydroxy) 04/29/2024 83 (H)  20 - 50 ng/mL Final    toxicity possible    WBC Count 04/29/2024 8.9  4.0 - 11.0 10e3/uL Final    RBC Count 04/29/2024 4.16  3.80 - 5.20 10e6/uL Final    Hemoglobin 04/29/2024 13.6  11.7 - 15.7 g/dL Final    Hematocrit 04/29/2024 40.8  35.0 - 47.0 % Final    MCV 04/29/2024 98  78 - 100 fL Final    MCH 04/29/2024 32.7  26.5 - 33.0 pg Final    MCHC 04/29/2024 33.3  31.5 - 36.5 g/dL Final    RDW 04/29/2024 12.0  10.0 - 15.0 % Final    Platelet Count 04/29/2024 352  150 - 450 10e3/uL Final    % Neutrophils 04/29/2024 66  % Final    % Lymphocytes 04/29/2024 26  % Final    % Monocytes 04/29/2024 7  % Final    % Eosinophils 04/29/2024 1  % Final    % Basophils 04/29/2024 1  % Final    % Immature Granulocytes 04/29/2024 0  % Final    Absolute Neutrophils 04/29/2024 5.8  1.6 - 8.3 10e3/uL Final    Absolute Lymphocytes 04/29/2024 2.3  0.8 - 5.3 10e3/uL Final    Absolute Monocytes 04/29/2024 0.7  0.0 - 1.3 10e3/uL Final    Absolute Eosinophils 04/29/2024 0.1  0.0 - 0.7 10e3/uL Final    Absolute Basophils 04/29/2024 0.1  0.0 - 0.2 10e3/uL Final    Absolute Immature Granulocytes 04/29/2024 0.0  <=0.4 10e3/uL Final         Imaging:    I personally reviewed the following imaging results today and those on care everywhere, if indicated     Nothing new to review    Reviewed imaging from [facility] sites and [place]     Medical Records Reviewed:    Reviewed consults/documents from [facility] and [place] including  Internal Medicine and Neurology

## 2024-07-01 NOTE — NURSING NOTE
Is the patient currently in the state of [place]? YES    Visit mode:VIDEO    If the visit is dropped, the patient can be reconnected by: TELEPHONE VISIT: Phone number:   Telephone Information:   Mobile [phone]       Will anyone else be joining the visit? NO  (If patient encounters technical issues they should call [phone])    How would you like to obtain your AVS? MyChart    Are changes needed to the allergy or medication list? Pt stated no med changes    Are refills needed on medications prescribed by this physician? NO    Reason for visit: Video Visit (2 month follow-up )    Debi MALHOTRA

## 2024-07-01 NOTE — LETTER
7/1/2024       RE: Inna Phillip  [address] [facility] [id number]     Dear Colleague,    Thank you for referring your patient, Inna Phillip, to the [facility] PHYSICAL MEDICINE AND REHABILITATION CLINIC [place] at [facility]. Please see a copy of my visit note below.    Inna Phillip is a 58 year old female who presents to be evaluated for a billable video visit.      Assessment/Impression   1. Memory loss/Impaired executive functioning  Patient with history of impaired executive functioning which worsened with COVID. Patient feels her memory is back to her baseline now.  She is working with Neurology and has MRI scheduled for later this month.      2. Chronic insomnia  Patient with long standing insomnia.  Discussed again scheduling with Sleep psychology and provided number for patient.  Discussed this can cause memory deficits long term.      3. Post COVID syndrome resolved  Patient with return of symptoms to her baseline. Discussed returning if symptoms return.    Plan:  I reviewed present knowledge on long-Covid.  Education was provided and question were answered.  Orders/Referrals as above  I will advised patient on test results  I will follow up with Inna Phillip in as need. I will review progress and consider need for any other therapeutic interventions. If there are any questions and/or concerns she will call the clinic.      On day of encounter time spent in chart review and with patient in consultation, exam, education,coordination of care,  review of outside charts/documentation and documentation:  21 minutes     I have attempted to proof read for major spelling errors and apologize for any minor errors I may have missed.     This note was dictated using voice recognition software. Any grammatical or context distortions are unintentional and inherent to the software.  _________________________________  Jovita  [facility] PHYSICAL MEDICINE AND REHABILITATION CLINIC [facility]   Patient returns for a follow up.  Briefly This 58 year old female presents to the [facility] Rehabilitation Medicine Post-COVID clinic as a new consult on 4/29/24 to evaluate continuing symptoms after COVID infection initially diagnosed 11/19/23.  Inna Phillip presented to online primary care on 11/20/23  complaining of  sore throat, congestion, cough, generalized aches and pains, fatigue, decreased taste, and decreased smell. Treatment was Paxlovid.  Inna Phillip experienced complications of concentration deficit.  Continuing symptoms include brain fog and distractibility.  Today she returns for a follow up. She was seen by internal medicine at [place] for mutliple symtoms including her depression, isomnia, fatigue and volvodyina.  She had extensive testing and was referred to the fibromyalgia clinic in [place].  She is back to her baseline for her memory and was prescribed Rexulti for 3 week by her mental health provider.  Her scores for her depression/anxiety have normalized.      Current concerns: Health Concerns        7/1/2024     9:22 AM   PHQ Assesment Total Score(s)   PHQ-9 Score 0           7/1/2024     9:24 AM   DEREK-7 Results   DEREK 7 TOTAL SCORE 3 (minimal anxiety)   DEREK-7 Total Score 3         7/1/2024     9:24 AM   PTSD Screen Score   Have you ever experienced this kind of event? No         7/1/2024     9:28 AM   PROMIS-29   PROMIS Physical Function T-Score 57 (within normal limits)   PROMIS Anxiety T-Score 58 (mild)   PROMIS Depression T-Score 41 (within normal limits)   PROMIS Fatigue T-Score 43 (within normal limits)   PROMIS Sleep Disturbance T-Score 62 (moderate)   PROMIS Ability to Participate in Social Roles & Activities T-Score 64 (within normal limits)   PROMIS Pain Interference T-Score 76 (severe)   PROMIS Pain Intensity 2       Past Medical History:    Diagnosis Date    Anxiety     Bipolar 2 disorder (H)     Fibromyalgia     Hypothyroidism     Mixed hyperlipidemia        Past Surgical History:   Procedure Laterality Date    COLONOSCOPY  10/31/2019    ENDOMETRIAL BIOPSY  01/20/2017    TONSILLECTOMY      Vagina sugery      [place] teeth Bilateral        Family History   Problem Relation Age of Onset    Anxiety Disorder Mother     Osteoporosis Mother     Hyperlipidemia Mother     Alcoholism Father     Alzheimer Disease Father     Lung Cancer Father     Diabetes Type 2  Father     Anxiety Disorder Maternal Grandmother     Breast Cancer Maternal Grandmother     Leukemia Maternal Grandfather     Breast Cancer Paternal Grandmother        Social History     Tobacco Use    Smoking status: Never    Smokeless tobacco: Never   Vaping Use    Vaping status: Never Used         Current Outpatient Medications:     ALPRAZolam (XANAX) 0.25 MG tablet, Take 1 tablet (0.25 mg) by mouth daily as needed for anxiety, Disp: 30 tablet, Rfl: 0    ALPRAZolam (XANAX) 0.25 MG tablet, Take 1 tablet (0.25 mg) by mouth nightly as needed for anxiety, Disp: 30 tablet, Rfl: 1    DULoxetine (CYMBALTA) 60 MG capsule, TAKE 1 CAPSULE BY MOUTH DAILY, Disp: 30 capsule, Rfl: 1    levothyroxine (SYNTHROID/LEVOTHROID) 125 MCG tablet, TAKE 1/2 TABLET BY MOUTH EVERY MORNING BEFORE A MEAL, Disp: , Rfl:     naltrexone (DEPADE/REVIA) 50 MG tablet, PATIENT TO MAKE UP A NALTREXONE SOLUTION PER PROVIDER DIRECTIONS. MAX DOSE OF 4.5 MG PER DAY, Disp: , Rfl:     zaleplon (SONATA) 5 MG capsule, Take 1-2 capsules (5-10 mg) by mouth At Bedtime, Disp: 60 capsule, Rfl: 1    Review of Systems   Constitutional, HEENT, cardiovascular, pulmonary, gi and gu systems are negative, except as otherwise noted.      Objective         Vitals:  No vitals were obtained today due to virtual visit.    Physical Exam   EYES: Eyes grossly normal to inspection.  No discharge or erythema, or obvious scleral/conjunctival abnormalities.  SKIN:  Visible skin clear. No significant rash, abnormal pigmentation or lesions.  NEURO: Cranial nerves grossly intact.  Mentation and speech appropriate for age.  GENERAL: Healthy, alert and no distress  RESP: No audible wheeze, cough, or visible cyanosis.  No visible retractions or increased work of breathing.    PSYCH: Mentation appears normal, affect normal/bright, judgement and insight intact, normal speech and appearance well-groomed.      Labs  Lab on 04/29/2024   Component Date Value Ref Range Status    Vitamin D, Total (25-Hydroxy) 04/29/2024 83 (H)  20 - 50 ng/mL Final    toxicity possible    WBC Count 04/29/2024 8.9  4.0 - 11.0 10e3/uL Final    RBC Count 04/29/2024 4.16  3.80 - 5.20 10e6/uL Final    Hemoglobin 04/29/2024 13.6  11.7 - 15.7 g/dL Final    Hematocrit 04/29/2024 40.8  35.0 - 47.0 % Final    MCV 04/29/2024 98  78 - 100 fL Final    MCH 04/29/2024 32.7  26.5 - 33.0 pg Final    MCHC 04/29/2024 33.3  31.5 - 36.5 g/dL Final    RDW 04/29/2024 12.0  10.0 - 15.0 % Final    Platelet Count 04/29/2024 352  150 - 450 10e3/uL Final    % Neutrophils 04/29/2024 66  % Final    % Lymphocytes 04/29/2024 26  % Final    % Monocytes 04/29/2024 7  % Final    % Eosinophils 04/29/2024 1  % Final    % Basophils 04/29/2024 1  % Final    % Immature Granulocytes 04/29/2024 0  % Final    Absolute Neutrophils 04/29/2024 5.8  1.6 - 8.3 10e3/uL Final    Absolute Lymphocytes 04/29/2024 2.3  0.8 - 5.3 10e3/uL Final    Absolute Monocytes 04/29/2024 0.7  0.0 - 1.3 10e3/uL Final    Absolute Eosinophils 04/29/2024 0.1  0.0 - 0.7 10e3/uL Final    Absolute Basophils 04/29/2024 0.1  0.0 - 0.2 10e3/uL Final    Absolute Immature Granulocytes 04/29/2024 0.0  <=0.4 10e3/uL Final         Imaging:    I personally reviewed the following imaging results today and those on care everywhere, if indicated     Nothing new to review    Reviewed imaging from [facility] sites and [place]     Medical Records Reviewed:    Reviewed consults/documents  from [facility] and [place] including  Internal Medicine and Neurology         Again, thank you for allowing me to participate in the care of your patient.      Sincerely,    Jovita Chaidez PA-C

## 2024-07-08 ENCOUNTER — TELEPHONE (OUTPATIENT)
Dept: NEUROLOGY | Facility: CLINIC | Age: 59
End: 2024-07-08
Payer: COMMERCIAL

## 2024-07-08 NOTE — TELEPHONE ENCOUNTER
Called pt to hang 7/25 appointment to lawl instead of inperson. Patient agreed. She will reschedule if it does not work with her work schedule.     Kortney Prince on 7/8/2024 at 1:16 PM

## 2024-07-29 ENCOUNTER — APPOINTMENT (OUTPATIENT)
Dept: URBAN - METROPOLITAN AREA CLINIC 255 | Age: 59
Setting detail: DERMATOLOGY
End: 2024-07-30

## 2024-07-29 VITALS — WEIGHT: 125 LBS | HEIGHT: 64 IN

## 2024-07-29 DIAGNOSIS — L81.4 OTHER MELANIN HYPERPIGMENTATION: ICD-10-CM

## 2024-07-29 DIAGNOSIS — I83.9 ASYMPTOMATIC VARICOSE VEINS OF LOWER EXTREMITIES: ICD-10-CM

## 2024-07-29 DIAGNOSIS — L72.0 EPIDERMAL CYST: ICD-10-CM

## 2024-07-29 DIAGNOSIS — D18.0 HEMANGIOMA: ICD-10-CM

## 2024-07-29 DIAGNOSIS — L82.1 OTHER SEBORRHEIC KERATOSIS: ICD-10-CM

## 2024-07-29 DIAGNOSIS — D22 MELANOCYTIC NEVI: ICD-10-CM

## 2024-07-29 DIAGNOSIS — D485 NEOPLASM OF UNCERTAIN BEHAVIOR OF SKIN: ICD-10-CM

## 2024-07-29 DIAGNOSIS — L82.0 INFLAMED SEBORRHEIC KERATOSIS: ICD-10-CM

## 2024-07-29 DIAGNOSIS — L57.8 OTHER SKIN CHANGES DUE TO CHRONIC EXPOSURE TO NONIONIZING RADIATION: ICD-10-CM

## 2024-07-29 DIAGNOSIS — S90.1 CONTUSION OF TOE WITHOUT DAMAGE TO NAIL: ICD-10-CM

## 2024-07-29 DIAGNOSIS — Z71.89 OTHER SPECIFIED COUNSELING: ICD-10-CM

## 2024-07-29 PROBLEM — D22.71 MELANOCYTIC NEVI OF RIGHT LOWER LIMB, INCLUDING HIP: Status: ACTIVE | Noted: 2024-07-29

## 2024-07-29 PROBLEM — D22.72 MELANOCYTIC NEVI OF LEFT LOWER LIMB, INCLUDING HIP: Status: ACTIVE | Noted: 2024-07-29

## 2024-07-29 PROBLEM — D22.5 MELANOCYTIC NEVI OF TRUNK: Status: ACTIVE | Noted: 2024-07-29

## 2024-07-29 PROBLEM — D18.01 HEMANGIOMA OF SKIN AND SUBCUTANEOUS TISSUE: Status: ACTIVE | Noted: 2024-07-29

## 2024-07-29 PROBLEM — D22.61 MELANOCYTIC NEVI OF RIGHT UPPER LIMB, INCLUDING SHOULDER: Status: ACTIVE | Noted: 2024-07-29

## 2024-07-29 PROBLEM — S90.112A CONTUSION OF LEFT GREAT TOE WITHOUT DAMAGE TO NAIL, INITIAL ENCOUNTER: Status: ACTIVE | Noted: 2024-07-29

## 2024-07-29 PROBLEM — I83.93 ASYMPTOMATIC VARICOSE VEINS OF BILATERAL LOWER EXTREMITIES: Status: ACTIVE | Noted: 2024-07-29

## 2024-07-29 PROBLEM — D48.5 NEOPLASM OF UNCERTAIN BEHAVIOR OF SKIN: Status: ACTIVE | Noted: 2024-07-29

## 2024-07-29 PROBLEM — D22.62 MELANOCYTIC NEVI OF LEFT UPPER LIMB, INCLUDING SHOULDER: Status: ACTIVE | Noted: 2024-07-29

## 2024-07-29 PROCEDURE — OTHER MIPS QUALITY: OTHER

## 2024-07-29 PROCEDURE — OTHER PATIENT SPECIFIC COUNSELING: OTHER

## 2024-07-29 PROCEDURE — OTHER PHOTO-DOCUMENTATION: OTHER

## 2024-07-29 PROCEDURE — 99203 OFFICE O/P NEW LOW 30 MIN: CPT | Mod: 25

## 2024-07-29 PROCEDURE — OTHER LIQUID NITROGEN: OTHER

## 2024-07-29 PROCEDURE — OTHER SUNSCREEN RECOMMENDATIONS: OTHER

## 2024-07-29 PROCEDURE — OTHER COUNSELING: OTHER

## 2024-07-29 PROCEDURE — 17110 DESTRUCT B9 LESION 1-14: CPT

## 2024-07-29 ASSESSMENT — LOCATION ZONE DERM
LOCATION ZONE: FACE
LOCATION ZONE: TOE
LOCATION ZONE: TRUNK
LOCATION ZONE: ARM
LOCATION ZONE: LEG
LOCATION ZONE: SCALP

## 2024-07-29 ASSESSMENT — LOCATION DETAILED DESCRIPTION DERM
LOCATION DETAILED: LEFT ANTECUBITAL SKIN
LOCATION DETAILED: LEFT INFERIOR FRONTAL SCALP
LOCATION DETAILED: LEFT DISTAL PLANTAR GREAT TOE
LOCATION DETAILED: RIGHT INFERIOR FRONTAL SCALP
LOCATION DETAILED: LEFT INFERIOR LATERAL MIDBACK
LOCATION DETAILED: LEFT VENTRAL DISTAL FOREARM
LOCATION DETAILED: LEFT VENTRAL PROXIMAL FOREARM
LOCATION DETAILED: LEFT MEDIAL UPPER BACK
LOCATION DETAILED: UPPER STERNUM
LOCATION DETAILED: MIDDLE STERNUM
LOCATION DETAILED: RIGHT VENTRAL DISTAL FOREARM
LOCATION DETAILED: LEFT INFERIOR CENTRAL MALAR CHEEK
LOCATION DETAILED: RIGHT CLAVICULAR SKIN
LOCATION DETAILED: RIGHT ANTERIOR DISTAL THIGH
LOCATION DETAILED: LEFT PROXIMAL POSTERIOR UPPER ARM
LOCATION DETAILED: RIGHT ANTERIOR PROXIMAL THIGH
LOCATION DETAILED: LEFT ANTERIOR DISTAL THIGH
LOCATION DETAILED: LEFT PROXIMAL LATERAL CALF
LOCATION DETAILED: RIGHT PROXIMAL PRETIBIAL REGION
LOCATION DETAILED: LEFT PROXIMAL PRETIBIAL REGION
LOCATION DETAILED: LEFT MEDIAL MALAR CHEEK
LOCATION DETAILED: PERIUMBILICAL SKIN
LOCATION DETAILED: RIGHT MEDIAL SUPERIOR CHEST
LOCATION DETAILED: LEFT SUPERIOR NASAL CHEEK
LOCATION DETAILED: LEFT DISTAL DORSAL FOREARM
LOCATION DETAILED: RIGHT VENTRAL PROXIMAL FOREARM
LOCATION DETAILED: INFERIOR THORACIC SPINE
LOCATION DETAILED: LEFT SUPERIOR MEDIAL MALAR CHEEK
LOCATION DETAILED: EPIGASTRIC SKIN
LOCATION DETAILED: RIGHT PROXIMAL DORSAL FOREARM
LOCATION DETAILED: LEFT ANTERIOR PROXIMAL THIGH

## 2024-07-29 ASSESSMENT — LOCATION SIMPLE DESCRIPTION DERM
LOCATION SIMPLE: LEFT POSTERIOR UPPER ARM
LOCATION SIMPLE: RIGHT THIGH
LOCATION SIMPLE: UPPER BACK
LOCATION SIMPLE: SCALP
LOCATION SIMPLE: LEFT GREAT TOE
LOCATION SIMPLE: LEFT UPPER BACK
LOCATION SIMPLE: LEFT THIGH
LOCATION SIMPLE: LEFT UPPER ARM
LOCATION SIMPLE: RIGHT CLAVICULAR SKIN
LOCATION SIMPLE: RIGHT FOREARM
LOCATION SIMPLE: ABDOMEN
LOCATION SIMPLE: RIGHT PRETIBIAL REGION
LOCATION SIMPLE: LEFT CALF
LOCATION SIMPLE: LEFT LOWER BACK
LOCATION SIMPLE: LEFT FOREARM
LOCATION SIMPLE: LEFT CHEEK
LOCATION SIMPLE: CHEST
LOCATION SIMPLE: LEFT PRETIBIAL REGION

## 2024-07-29 NOTE — PROCEDURE: LIQUID NITROGEN
Medical Necessity Information: It is in your best interest to select a reason for this procedure from the list below. All of these items fulfill various CMS LCD requirements except the new and changing color options.
Show Topical Anesthesia Variable?: Yes
Detail Level: Zone
Application Tool (Optional): Liquid Nitrogen Sprayer
Post-Care Instructions: I reviewed with the patient in detail post-care instructions. Patient is to wear sunprotection, and avoid picking at any of the treated lesions. Pt may apply Vaseline to crusted or scabbing areas.
Number Of Freeze-Thaw Cycles: 3 freeze-thaw cycles
Duration Of Freeze Thaw-Cycle (Seconds): 2
Render Note In Bullet Format When Appropriate: No
Spray Paint Text: The liquid nitrogen was applied to the skin utilizing a spray paint frosting technique.
Medical Necessity Clause: This procedure was medically necessary because the lesions that were treated were:
Consent: The patient's consent was obtained including but not limited to risks of crusting, scabbing, blistering, scarring, darker or lighter pigmentary change, recurrence, incomplete removal and infection.

## 2024-07-29 NOTE — PROCEDURE: PATIENT SPECIFIC COUNSELING
Detail Level: Zone
Advised annual SCE.
-Educated patient that sun spot can turn into Lentigo maligna. \\n-Reassured patient that it appears non-cancerous today and no treatment is required. \\n-Patient was agreeable.
-Refer to [facility] ([place]) for extraction treatment.
-Educated patient about spider veins in the legs. \\n-Recommended working with physicians at vein clinic for future treatment.
-Discussed option to remove via biopsy today due to irritation. \\n-Patient declined biopsy and will contact insurance to see if it will be covered. \\n-Will RTC as needed.
-Educated patient that it may be in result from pressure of shoes. \\n-Advised patient to monitor and RTC if spot begins to extend from skin on toe.\\n-Patient was agreeable.
-Referred for consultation for laser treatment with Evelyn/Cara.

## 2024-07-29 NOTE — HPI: FULL BODY SKIN EXAMINATION
What Type Of Note Output Would You Prefer (Optional)?: Standard Output
What Is The Reason For Today's Visit?: Full Body Skin Examination
What Is The Reason For Today's Visit? (Being Monitored For X): concerning skin lesions on an annual basis
Additional History: The patient hasn’t had a full body for 30 years. The mole on the back and has been there “forever” and is irritated and itchy. The patient reports having white moles on her collarbone area that she would like examined. The patient says she has a couple spots she would like frozen on her face.\\nSister had melanoma

## 2024-09-13 ENCOUNTER — HOSPITAL ENCOUNTER (OUTPATIENT)
Dept: MRI IMAGING | Facility: CLINIC | Age: 59
Discharge: HOME | End: 2024-09-13
Attending: PSYCHIATRY & NEUROLOGY | Admitting: PSYCHIATRY & NEUROLOGY
Payer: COMMERCIAL

## 2024-09-13 DIAGNOSIS — R41.3 MEMORY LOSS: ICD-10-CM

## 2024-09-13 PROCEDURE — 70553 MRI BRAIN STEM W/O & W/DYE: CPT

## 2024-09-13 PROCEDURE — A9585 GADOBUTROL INJECTION: HCPCS | Performed by: PSYCHIATRY & NEUROLOGY

## 2024-09-13 PROCEDURE — 255N000002 HC RX 255 OP 636: Performed by: PSYCHIATRY & NEUROLOGY

## 2024-09-13 RX ORDER — GADOBUTROL 604.72 MG/ML
6 INJECTION INTRAVENOUS ONCE
Status: COMPLETED | OUTPATIENT
Start: 2024-09-13 | End: 2024-09-13

## 2024-09-13 RX ADMIN — GADOBUTROL 6 ML: 604.72 INJECTION INTRAVENOUS at 07:59

## 2024-09-23 DIAGNOSIS — F41.9 ANXIETY: ICD-10-CM

## 2024-09-25 RX ORDER — DULOXETIN HYDROCHLORIDE 60 MG/1
60 CAPSULE, DELAYED RELEASE ORAL DAILY
Qty: 30 CAPSULE | Refills: 1 | Status: SHIPPED | OUTPATIENT
Start: 2024-09-25

## 2024-09-25 NOTE — TELEPHONE ENCOUNTER
4/29/2024     7:43 AM 5/23/2024     8:42 AM 7/1/2024     9:22 AM   PHQ   PHQ-9 Total Score 15 18 0   Q9: Thoughts of better off dead/self-harm past 2 weeks Not at all More than half the days Not at all          11/28/2022    10:07 AM 4/29/2024     7:44 AM 7/1/2024     9:24 AM   DEREK-7 SCORE   Total Score 0 (minimal anxiety) 18 (severe anxiety) 3 (minimal anxiety)   Total Score 0 18 3

## 2024-11-26 DIAGNOSIS — F41.9 ANXIETY: ICD-10-CM

## 2024-11-27 RX ORDER — DULOXETIN HYDROCHLORIDE 60 MG/1
60 CAPSULE, DELAYED RELEASE ORAL DAILY
Qty: 10 CAPSULE | Refills: 1 | Status: SHIPPED | OUTPATIENT
Start: 2024-11-27

## 2024-12-24 DIAGNOSIS — F41.9 ANXIETY: ICD-10-CM

## 2024-12-26 RX ORDER — DULOXETIN HYDROCHLORIDE 60 MG/1
60 CAPSULE, DELAYED RELEASE ORAL DAILY
Qty: 10 CAPSULE | Refills: 1 | Status: SHIPPED | OUTPATIENT
Start: 2024-12-26

## 2025-03-01 ENCOUNTER — HEALTH MAINTENANCE LETTER (OUTPATIENT)
Age: 60
End: 2025-03-01

## (undated) RX ORDER — LIDOCAINE HYDROCHLORIDE 10 MG/ML
INJECTION, SOLUTION EPIDURAL; INFILTRATION; INTRACAUDAL; PERINEURAL
Status: DISPENSED
Start: 2022-07-29

## (undated) RX ORDER — TRIAMCINOLONE ACETONIDE 40 MG/ML
INJECTION, SUSPENSION INTRA-ARTICULAR; INTRAMUSCULAR
Status: DISPENSED
Start: 2022-07-29